# Patient Record
Sex: MALE | Race: WHITE | NOT HISPANIC OR LATINO | URBAN - METROPOLITAN AREA
[De-identification: names, ages, dates, MRNs, and addresses within clinical notes are randomized per-mention and may not be internally consistent; named-entity substitution may affect disease eponyms.]

---

## 2017-03-08 ENCOUNTER — AMBULATORY - HEALTHEAST (OUTPATIENT)
Dept: CARDIOLOGY | Facility: CLINIC | Age: 82
End: 2017-03-08

## 2017-03-08 DIAGNOSIS — I35.0 AORTIC STENOSIS: ICD-10-CM

## 2017-03-08 DIAGNOSIS — I65.29 CAROTID STENOSIS: ICD-10-CM

## 2017-03-23 ENCOUNTER — AMBULATORY - HEALTHEAST (OUTPATIENT)
Dept: CARDIOLOGY | Facility: CLINIC | Age: 82
End: 2017-03-23

## 2017-03-23 DIAGNOSIS — I35.0 SEVERE AORTIC STENOSIS: ICD-10-CM

## 2017-03-23 ASSESSMENT — MIFFLIN-ST. JEOR: SCORE: 1515.38

## 2017-03-24 ASSESSMENT — MIFFLIN-ST. JEOR: SCORE: 1517.19

## 2017-03-25 ASSESSMENT — MIFFLIN-ST. JEOR: SCORE: 1529.89

## 2017-03-26 ASSESSMENT — MIFFLIN-ST. JEOR: SCORE: 1537.61

## 2017-03-27 ENCOUNTER — SURGERY - HEALTHEAST (OUTPATIENT)
Dept: CARDIOLOGY | Facility: CLINIC | Age: 82
End: 2017-03-27

## 2017-03-27 ASSESSMENT — MIFFLIN-ST. JEOR: SCORE: 1536.41

## 2017-03-28 ASSESSMENT — MIFFLIN-ST. JEOR: SCORE: 1535.79

## 2017-03-29 ENCOUNTER — AMBULATORY - HEALTHEAST (OUTPATIENT)
Dept: CARDIOLOGY | Facility: CLINIC | Age: 82
End: 2017-03-29

## 2017-03-29 DIAGNOSIS — I35.0 SEVERE AORTIC STENOSIS: ICD-10-CM

## 2017-03-29 ASSESSMENT — MIFFLIN-ST. JEOR: SCORE: 1531.25

## 2017-04-03 ENCOUNTER — HOSPITAL ENCOUNTER (OUTPATIENT)
Dept: RESPIRATORY THERAPY | Facility: HOSPITAL | Age: 82
Discharge: HOME | End: 2017-04-03
Attending: INTERNAL MEDICINE

## 2017-04-03 DIAGNOSIS — I35.0 SEVERE AORTIC STENOSIS: ICD-10-CM

## 2017-04-04 ENCOUNTER — OFFICE VISIT - HEALTHEAST (OUTPATIENT)
Dept: CARDIOLOGY | Facility: CLINIC | Age: 82
End: 2017-04-04

## 2017-04-04 ENCOUNTER — AMBULATORY - HEALTHEAST (OUTPATIENT)
Dept: CARDIOLOGY | Facility: CLINIC | Age: 82
End: 2017-04-04

## 2017-04-04 DIAGNOSIS — I35.0 SEVERE AORTIC STENOSIS: ICD-10-CM

## 2017-04-04 DIAGNOSIS — I35.0 AORTIC STENOSIS: ICD-10-CM

## 2017-04-04 ASSESSMENT — MIFFLIN-ST. JEOR: SCORE: 1534.43

## 2017-04-05 ENCOUNTER — AMBULATORY - HEALTHEAST (OUTPATIENT)
Dept: CARDIOLOGY | Facility: CLINIC | Age: 82
End: 2017-04-05

## 2017-04-07 ENCOUNTER — AMBULATORY - HEALTHEAST (OUTPATIENT)
Dept: CARDIOLOGY | Facility: CLINIC | Age: 82
End: 2017-04-07

## 2017-04-11 ENCOUNTER — AMBULATORY - HEALTHEAST (OUTPATIENT)
Dept: CARDIOLOGY | Facility: CLINIC | Age: 82
End: 2017-04-11

## 2017-04-11 ENCOUNTER — ANESTHESIA - HEALTHEAST (OUTPATIENT)
Dept: CARDIOLOGY | Facility: CLINIC | Age: 82
End: 2017-04-11

## 2017-04-11 ENCOUNTER — SURGERY - HEALTHEAST (OUTPATIENT)
Dept: CARDIOLOGY | Facility: CLINIC | Age: 82
End: 2017-04-11

## 2017-04-11 ENCOUNTER — HOSPITAL ENCOUNTER (OUTPATIENT)
Dept: RADIOLOGY | Facility: CLINIC | Age: 82
Discharge: HOME | End: 2017-04-11
Attending: INTERNAL MEDICINE

## 2017-04-11 DIAGNOSIS — I50.43 ACUTE ON CHRONIC COMBINED SYSTOLIC AND DIASTOLIC CONGESTIVE HEART FAILURE (H): ICD-10-CM

## 2017-04-11 DIAGNOSIS — I35.0 SEVERE AORTIC STENOSIS: ICD-10-CM

## 2017-04-11 DIAGNOSIS — Z01.810 PRE-OPERATIVE CARDIOVASCULAR EXAMINATION, HIGH RISK SURGERY: ICD-10-CM

## 2017-04-11 LAB
ATRIAL RATE - MUSE: 57 BPM
DIASTOLIC BLOOD PRESSURE - MUSE: NORMAL MMHG
INTERPRETATION ECG - MUSE: NORMAL
P AXIS - MUSE: 80 DEGREES
PR INTERVAL - MUSE: 234 MS
QRS DURATION - MUSE: 86 MS
QT - MUSE: 438 MS
QTC - MUSE: 426 MS
R AXIS - MUSE: 8 DEGREES
SYSTOLIC BLOOD PRESSURE - MUSE: NORMAL MMHG
T AXIS - MUSE: 90 DEGREES
VENTRICULAR RATE- MUSE: 57 BPM

## 2017-04-11 ASSESSMENT — MIFFLIN-ST. JEOR: SCORE: 1516.29

## 2017-04-12 ENCOUNTER — SURGERY - HEALTHEAST (OUTPATIENT)
Dept: CARDIOLOGY | Facility: CLINIC | Age: 82
End: 2017-04-12

## 2017-04-12 ENCOUNTER — AMBULATORY - HEALTHEAST (OUTPATIENT)
Dept: CARDIOLOGY | Facility: CLINIC | Age: 82
End: 2017-04-12

## 2017-04-12 DIAGNOSIS — Z95.2 S/P TAVR (TRANSCATHETER AORTIC VALVE REPLACEMENT): ICD-10-CM

## 2017-04-12 ASSESSMENT — MIFFLIN-ST. JEOR: SCORE: 1514.47

## 2017-04-13 ASSESSMENT — MIFFLIN-ST. JEOR: SCORE: 1553.93

## 2017-04-14 ASSESSMENT — MIFFLIN-ST. JEOR: SCORE: 1557.96

## 2017-04-15 ENCOUNTER — HOME CARE/HOSPICE - HEALTHEAST (OUTPATIENT)
Dept: HOME HEALTH SERVICES | Facility: HOME HEALTH | Age: 82
End: 2017-04-15

## 2017-04-15 ASSESSMENT — MIFFLIN-ST. JEOR: SCORE: 1536.98

## 2017-04-18 ENCOUNTER — AMBULATORY - HEALTHEAST (OUTPATIENT)
Dept: CARDIOLOGY | Facility: CLINIC | Age: 82
End: 2017-04-18

## 2017-04-25 ENCOUNTER — AMBULATORY - HEALTHEAST (OUTPATIENT)
Dept: CARDIAC REHAB | Facility: HOSPITAL | Age: 82
End: 2017-04-25

## 2017-04-25 DIAGNOSIS — I35.0 SEVERE AORTIC STENOSIS: ICD-10-CM

## 2017-04-25 DIAGNOSIS — Z95.2 S/P TAVR (TRANSCATHETER AORTIC VALVE REPLACEMENT): ICD-10-CM

## 2017-05-01 ENCOUNTER — HOME CARE/HOSPICE - HEALTHEAST (OUTPATIENT)
Dept: HOME HEALTH SERVICES | Facility: HOME HEALTH | Age: 82
End: 2017-05-01

## 2017-05-02 ENCOUNTER — HOME CARE/HOSPICE - HEALTHEAST (OUTPATIENT)
Dept: HOME HEALTH SERVICES | Facility: HOME HEALTH | Age: 82
End: 2017-05-02

## 2017-05-04 ENCOUNTER — HOME CARE/HOSPICE - HEALTHEAST (OUTPATIENT)
Dept: HOME HEALTH SERVICES | Facility: HOME HEALTH | Age: 82
End: 2017-05-04

## 2017-05-06 ENCOUNTER — HOME CARE/HOSPICE - HEALTHEAST (OUTPATIENT)
Dept: HOME HEALTH SERVICES | Facility: HOME HEALTH | Age: 82
End: 2017-05-06

## 2017-05-08 ENCOUNTER — HOME CARE/HOSPICE - HEALTHEAST (OUTPATIENT)
Dept: HOME HEALTH SERVICES | Facility: HOME HEALTH | Age: 82
End: 2017-05-08

## 2017-05-09 ENCOUNTER — HOME CARE/HOSPICE - HEALTHEAST (OUTPATIENT)
Dept: HOME HEALTH SERVICES | Facility: HOME HEALTH | Age: 82
End: 2017-05-09

## 2017-05-10 ENCOUNTER — HOME CARE/HOSPICE - HEALTHEAST (OUTPATIENT)
Dept: HOME HEALTH SERVICES | Facility: HOME HEALTH | Age: 82
End: 2017-05-10

## 2017-05-11 ENCOUNTER — HOME CARE/HOSPICE - HEALTHEAST (OUTPATIENT)
Dept: HOME HEALTH SERVICES | Facility: HOME HEALTH | Age: 82
End: 2017-05-11

## 2017-05-12 ENCOUNTER — RECORDS - HEALTHEAST (OUTPATIENT)
Dept: ADMINISTRATIVE | Facility: OTHER | Age: 82
End: 2017-05-12

## 2017-05-13 ENCOUNTER — HOME CARE/HOSPICE - HEALTHEAST (OUTPATIENT)
Dept: HOME HEALTH SERVICES | Facility: HOME HEALTH | Age: 82
End: 2017-05-13

## 2017-05-15 ENCOUNTER — HOSPITAL ENCOUNTER (OUTPATIENT)
Dept: CARDIOLOGY | Facility: HOSPITAL | Age: 82
Discharge: HOME | End: 2017-05-15
Attending: INTERNAL MEDICINE

## 2017-05-15 DIAGNOSIS — Z95.2 S/P TAVR (TRANSCATHETER AORTIC VALVE REPLACEMENT): ICD-10-CM

## 2017-05-15 LAB
AORTIC ROOT: 2.9 CM
AORTIC VALVE MEAN VELOCITY: 194 CM/S
AR DECEL SLOPE: 1820 MM/S2
AR PEAK VELOCITY: 288 CM/S
AV DIMENSIONLESS INDEX VTI: 0.3
AV MEAN GRADIENT: 17 MMHG
AV PEAK GRADIENT: 33.9 MMHG
AV REGURGITANT PEAK GRADIENT: 33.2 MMHG
AV REGURGITATION PRESSURE HALF TIME: 533 MS
AV VALVE AREA: 0.9 CM2
AV VELOCITY RATIO: 0.3
BSA FOR ECHO PROCEDURE: 2.06 M2
CV BLOOD PRESSURE: NORMAL MMHG
CV ECHO HEIGHT: 69 IN
CV ECHO WEIGHT: 192 LBS
DOP CALC AO PEAK VEL: 291 CM/S
DOP CALC AO VTI: 69.8 CM
DOP CALC LVOT AREA: 2.83 CM2
DOP CALC LVOT DIAMETER: 1.9 CM
DOP CALC LVOT PEAK VEL: 85 CM/S
DOP CALC LVOT STROKE VOLUME: 62.9 CM3
DOP CALCLVOT PEAK VEL VTI: 22.2 CM
ECHO EJECTION FRACTION ESTIMATED: 60 %
EJECTION FRACTION: 50 % (ref 55–75)
FRACTIONAL SHORTENING: 38.1 % (ref 28–44)
INTERVENTRICULAR SEPTUM IN END DIASTOLE: 0.83 CM (ref 0.6–1)
IVS/PW RATIO: 0.6
LA AREA 1: 17.2 CM2
LA AREA 2: 13.8 CM2
LEFT ATRIUM LENGTH: 4.2 CM
LEFT ATRIUM SIZE: 3 CM
LEFT ATRIUM TO AORTIC ROOT RATIO: 1.03 NO UNITS
LEFT ATRIUM VOLUME INDEX: 23.3 ML/M2
LEFT ATRIUM VOLUME: 48 CM3
LEFT VENTRICLE CARDIAC INDEX: 1.7 L/MIN/M2
LEFT VENTRICLE CARDIAC OUTPUT: 3.5 L/MIN
LEFT VENTRICLE DIASTOLIC VOLUME INDEX: 51 CM3/M2 (ref 34–74)
LEFT VENTRICLE DIASTOLIC VOLUME: 105 CM3 (ref 62–150)
LEFT VENTRICLE HEART RATE: 56 BPM
LEFT VENTRICLE MASS INDEX: 95.3 G/M2
LEFT VENTRICLE SYSTOLIC VOLUME INDEX: 25.2 CM3/M2 (ref 11–31)
LEFT VENTRICLE SYSTOLIC VOLUME: 52 CM3 (ref 21–61)
LEFT VENTRICULAR INTERNAL DIMENSION IN DIASTOLE: 4.83 CM (ref 4.2–5.8)
LEFT VENTRICULAR INTERNAL DIMENSION IN SYSTOLE: 2.99 CM (ref 2.5–4)
LEFT VENTRICULAR MASS: 196.3 G
LEFT VENTRICULAR OUTFLOW TRACT MEAN GRADIENT: 2 MMHG
LEFT VENTRICULAR OUTFLOW TRACT MEAN VELOCITY: 62 CM/S
LEFT VENTRICULAR OUTFLOW TRACT PEAK GRADIENT: 3 MMHG
LEFT VENTRICULAR POSTERIOR WALL IN END DIASTOLE: 1.37 CM (ref 0.6–1)
LV STROKE VOLUME INDEX: 30.5 ML/M2
MITRAL VALVE E/A RATIO: 1.7
MV AVERAGE E/E' RATIO: 11.6 CM/S
MV DECELERATION TIME: 215 MS
MV E'TISSUE VEL-LAT: 8.61 CM/S
MV E'TISSUE VEL-MED: 6.58 CM/S
MV LATERAL E/E' RATIO: 10.2
MV MEDIAL E/E' RATIO: 13.4
MV PEAK A VELOCITY: 52 CM/S
MV PEAK E VELOCITY: 88.1 CM/S
NUC REST DIASTOLIC VOLUME INDEX: 3072 LBS
NUC REST SYSTOLIC VOLUME INDEX: 69 IN
RIGHT VENTRICULAR INTERNAL DIMENSION IN DYSTOLE: 2.4 CM
TRICUSPID VALVE ANULAR PLANE SYSTOLIC EXCURSION: 2.1 CM

## 2017-05-15 ASSESSMENT — MIFFLIN-ST. JEOR: SCORE: 1516.29

## 2017-05-16 ENCOUNTER — HOME CARE/HOSPICE - HEALTHEAST (OUTPATIENT)
Dept: HOME HEALTH SERVICES | Facility: HOME HEALTH | Age: 82
End: 2017-05-16

## 2017-05-17 ENCOUNTER — HOME CARE/HOSPICE - HEALTHEAST (OUTPATIENT)
Dept: HOME HEALTH SERVICES | Facility: HOME HEALTH | Age: 82
End: 2017-05-17

## 2017-05-18 ENCOUNTER — HOME CARE/HOSPICE - HEALTHEAST (OUTPATIENT)
Dept: HOME HEALTH SERVICES | Facility: HOME HEALTH | Age: 82
End: 2017-05-18

## 2017-05-18 ENCOUNTER — OFFICE VISIT - HEALTHEAST (OUTPATIENT)
Dept: CARDIOLOGY | Facility: CLINIC | Age: 82
End: 2017-05-18

## 2017-05-18 DIAGNOSIS — Z95.2 S/P TAVR (TRANSCATHETER AORTIC VALVE REPLACEMENT): ICD-10-CM

## 2017-05-18 DIAGNOSIS — I63.9 CVA (CEREBRAL VASCULAR ACCIDENT) (H): ICD-10-CM

## 2017-05-18 LAB
ATRIAL RATE - MUSE: 69 BPM
DIASTOLIC BLOOD PRESSURE - MUSE: NORMAL MMHG
INTERPRETATION ECG - MUSE: NORMAL
P AXIS - MUSE: 83 DEGREES
PR INTERVAL - MUSE: 198 MS
QRS DURATION - MUSE: 88 MS
QT - MUSE: 394 MS
QTC - MUSE: 445 MS
R AXIS - MUSE: -25 DEGREES
SYSTOLIC BLOOD PRESSURE - MUSE: NORMAL MMHG
T AXIS - MUSE: 52 DEGREES
VENTRICULAR RATE- MUSE: 77 BPM

## 2017-05-18 ASSESSMENT — MIFFLIN-ST. JEOR: SCORE: 1498.14

## 2017-05-19 ENCOUNTER — HOME CARE/HOSPICE - HEALTHEAST (OUTPATIENT)
Dept: HOME HEALTH SERVICES | Facility: HOME HEALTH | Age: 82
End: 2017-05-19

## 2017-05-22 ENCOUNTER — HOSPITAL ENCOUNTER (OUTPATIENT)
Dept: CARDIOLOGY | Facility: HOSPITAL | Age: 82
Discharge: HOME | End: 2017-05-22
Attending: INTERNAL MEDICINE

## 2017-05-22 DIAGNOSIS — I63.9 CVA (CEREBRAL VASCULAR ACCIDENT) (H): ICD-10-CM

## 2017-05-23 ENCOUNTER — HOME CARE/HOSPICE - HEALTHEAST (OUTPATIENT)
Dept: HOME HEALTH SERVICES | Facility: HOME HEALTH | Age: 82
End: 2017-05-23

## 2017-05-24 ENCOUNTER — HOME CARE/HOSPICE - HEALTHEAST (OUTPATIENT)
Dept: HOME HEALTH SERVICES | Facility: HOME HEALTH | Age: 82
End: 2017-05-24

## 2017-05-25 ENCOUNTER — HOME CARE/HOSPICE - HEALTHEAST (OUTPATIENT)
Dept: HOME HEALTH SERVICES | Facility: HOME HEALTH | Age: 82
End: 2017-05-25

## 2017-05-25 ENCOUNTER — COMMUNICATION - HEALTHEAST (OUTPATIENT)
Dept: CARDIOLOGY | Facility: CLINIC | Age: 82
End: 2017-05-25

## 2017-05-25 DIAGNOSIS — I47.29 NSVT (NONSUSTAINED VENTRICULAR TACHYCARDIA) (H): ICD-10-CM

## 2017-05-26 ENCOUNTER — HOME CARE/HOSPICE - HEALTHEAST (OUTPATIENT)
Dept: HOME HEALTH SERVICES | Facility: HOME HEALTH | Age: 82
End: 2017-05-26

## 2017-05-29 ENCOUNTER — HOME CARE/HOSPICE - HEALTHEAST (OUTPATIENT)
Dept: HOME HEALTH SERVICES | Facility: HOME HEALTH | Age: 82
End: 2017-05-29

## 2017-06-08 ENCOUNTER — RECORDS - HEALTHEAST (OUTPATIENT)
Dept: ADMINISTRATIVE | Facility: OTHER | Age: 82
End: 2017-06-08

## 2017-06-22 ENCOUNTER — RECORDS - HEALTHEAST (OUTPATIENT)
Dept: ADMINISTRATIVE | Facility: OTHER | Age: 82
End: 2017-06-22

## 2017-07-05 ENCOUNTER — RECORDS - HEALTHEAST (OUTPATIENT)
Dept: LAB | Facility: CLINIC | Age: 82
End: 2017-07-05

## 2017-07-05 LAB
CHOLEST SERPL-MCNC: 155 MG/DL
FASTING STATUS PATIENT QL REPORTED: NORMAL
HBA1C MFR BLD: 8.4 % (ref 4.2–6.1)
HDLC SERPL-MCNC: 58 MG/DL
LDLC SERPL CALC-MCNC: 78 MG/DL
TRIGL SERPL-MCNC: 95 MG/DL

## 2017-09-18 ENCOUNTER — AMBULATORY - HEALTHEAST (OUTPATIENT)
Dept: CARDIOLOGY | Facility: CLINIC | Age: 82
End: 2017-09-18

## 2017-09-18 ENCOUNTER — RECORDS - HEALTHEAST (OUTPATIENT)
Dept: ADMINISTRATIVE | Facility: OTHER | Age: 82
End: 2017-09-18

## 2017-09-21 ENCOUNTER — OFFICE VISIT - HEALTHEAST (OUTPATIENT)
Dept: CARDIOLOGY | Facility: CLINIC | Age: 82
End: 2017-09-21

## 2017-09-21 ENCOUNTER — AMBULATORY - HEALTHEAST (OUTPATIENT)
Dept: CARDIOLOGY | Facility: CLINIC | Age: 82
End: 2017-09-21

## 2017-09-21 DIAGNOSIS — I10 ESSENTIAL HYPERTENSION WITH GOAL BLOOD PRESSURE LESS THAN 140/90: ICD-10-CM

## 2017-09-21 DIAGNOSIS — I50.30 HEART FAILURE WITH PRESERVED EJECTION FRACTION, BORDERLINE, CLASS III (H): ICD-10-CM

## 2017-09-21 ASSESSMENT — MIFFLIN-ST. JEOR: SCORE: 1536.69

## 2017-09-22 ENCOUNTER — COMMUNICATION - HEALTHEAST (OUTPATIENT)
Dept: CARDIOLOGY | Facility: CLINIC | Age: 82
End: 2017-09-22

## 2017-09-22 DIAGNOSIS — I16.0 HYPERTENSIVE URGENCY: ICD-10-CM

## 2017-09-28 ENCOUNTER — AMBULATORY - HEALTHEAST (OUTPATIENT)
Dept: CARDIOLOGY | Facility: CLINIC | Age: 82
End: 2017-09-28

## 2017-09-28 DIAGNOSIS — I50.30 HEART FAILURE WITH PRESERVED EJECTION FRACTION, BORDERLINE, CLASS III (H): ICD-10-CM

## 2017-10-21 ENCOUNTER — HOME CARE/HOSPICE - HEALTHEAST (OUTPATIENT)
Dept: HOME HEALTH SERVICES | Facility: HOME HEALTH | Age: 82
End: 2017-10-21

## 2017-10-24 ENCOUNTER — OFFICE VISIT - HEALTHEAST (OUTPATIENT)
Dept: GERIATRICS | Facility: CLINIC | Age: 82
End: 2017-10-24

## 2017-10-24 DIAGNOSIS — Z95.2 S/P TAVR (TRANSCATHETER AORTIC VALVE REPLACEMENT): ICD-10-CM

## 2017-10-24 DIAGNOSIS — R55 PRE-SYNCOPE: ICD-10-CM

## 2017-10-24 DIAGNOSIS — R55 POSTURAL DIZZINESS WITH PRESYNCOPE: ICD-10-CM

## 2017-10-24 DIAGNOSIS — R42 POSTURAL DIZZINESS WITH PRESYNCOPE: ICD-10-CM

## 2017-10-24 DIAGNOSIS — S22.39XA CLOSED RIB FRACTURE: ICD-10-CM

## 2017-10-24 DIAGNOSIS — I35.0 SEVERE AORTIC STENOSIS: ICD-10-CM

## 2017-10-27 ENCOUNTER — OFFICE VISIT - HEALTHEAST (OUTPATIENT)
Dept: GERIATRICS | Facility: CLINIC | Age: 82
End: 2017-10-27

## 2017-10-27 DIAGNOSIS — R55 PRE-SYNCOPE: ICD-10-CM

## 2017-10-27 DIAGNOSIS — S22.39XA CLOSED RIB FRACTURE: ICD-10-CM

## 2017-10-27 DIAGNOSIS — G47.00 INSOMNIA: ICD-10-CM

## 2017-10-27 DIAGNOSIS — Z95.2 S/P TAVR (TRANSCATHETER AORTIC VALVE REPLACEMENT): ICD-10-CM

## 2017-10-31 ENCOUNTER — OFFICE VISIT - HEALTHEAST (OUTPATIENT)
Dept: GERIATRICS | Facility: CLINIC | Age: 82
End: 2017-10-31

## 2017-10-31 DIAGNOSIS — G47.00 INSOMNIA: ICD-10-CM

## 2017-10-31 DIAGNOSIS — I10 UNCONTROLLED HYPERTENSION: ICD-10-CM

## 2017-10-31 DIAGNOSIS — S22.39XA CLOSED RIB FRACTURE: ICD-10-CM

## 2017-11-02 ENCOUNTER — AMBULATORY - HEALTHEAST (OUTPATIENT)
Dept: GERIATRICS | Facility: CLINIC | Age: 82
End: 2017-11-02

## 2017-11-06 ENCOUNTER — OFFICE VISIT - HEALTHEAST (OUTPATIENT)
Dept: CARDIOLOGY | Facility: CLINIC | Age: 82
End: 2017-11-06

## 2017-11-06 DIAGNOSIS — I16.0 HYPERTENSIVE URGENCY: ICD-10-CM

## 2017-11-06 DIAGNOSIS — I50.30 HEART FAILURE WITH PRESERVED EJECTION FRACTION, BORDERLINE, CLASS III (H): ICD-10-CM

## 2017-11-06 DIAGNOSIS — Z95.2 S/P AVR (AORTIC VALVE REPLACEMENT): ICD-10-CM

## 2017-11-06 ASSESSMENT — MIFFLIN-ST. JEOR: SCORE: 1548.04

## 2017-11-13 ENCOUNTER — HOSPITAL ENCOUNTER (OUTPATIENT)
Dept: CARDIOLOGY | Facility: HOSPITAL | Age: 82
Discharge: HOME | End: 2017-11-13
Attending: INTERNAL MEDICINE

## 2017-11-13 LAB
AORTIC ROOT: 3.5 CM
AORTIC VALVE MEAN VELOCITY: 209 CM/S
AR DECEL SLOPE: 3500 MM/S2
AR PEAK VELOCITY: 438 CM/S
AV DIMENSIONLESS INDEX VTI: 0.5
AV MEAN GRADIENT: 20 MMHG
AV PEAK GRADIENT: 40.7 MMHG
AV REGURGITANT PEAK GRADIENT: 76.7 MMHG
AV REGURGITATION PRESSURE HALF TIME: 368 MS
AV VALVE AREA: 1.4 CM2
AV VELOCITY RATIO: 0.4
BSA FOR ECHO PROCEDURE: 2.09 M2
CV BLOOD PRESSURE: NORMAL MMHG
CV ECHO HEIGHT: 69 IN
CV ECHO WEIGHT: 199 LBS
DOP CALC AO PEAK VEL: 319 CM/S
DOP CALC AO VTI: 69.4 CM
DOP CALC LVOT AREA: 2.83 CM2
DOP CALC LVOT DIAMETER: 1.9 CM
DOP CALC LVOT PEAK VEL: 131 CM/S
DOP CALC LVOT STROKE VOLUME: 99.8 CM3
DOP CALCLVOT PEAK VEL VTI: 35.2 CM
EJECTION FRACTION: 60 % (ref 55–75)
FRACTIONAL SHORTENING: 43.9 % (ref 28–44)
INTERVENTRICULAR SEPTUM IN END DIASTOLE: 1.1 CM (ref 0.6–1)
IVS/PW RATIO: 0.9
LA AREA 1: 24.6 CM2
LA AREA 2: 23.1 CM2
LEFT ATRIUM LENGTH: 5.5 CM
LEFT ATRIUM SIZE: 4.7 CM
LEFT ATRIUM TO AORTIC ROOT RATIO: 1.88 NO UNITS
LEFT ATRIUM VOLUME INDEX: 42 ML/M2
LEFT ATRIUM VOLUME: 87.8 CM3
LEFT VENTRICLE CARDIAC INDEX: 3.1 L/MIN/M2
LEFT VENTRICLE CARDIAC OUTPUT: 6.4 L/MIN
LEFT VENTRICLE DIASTOLIC VOLUME INDEX: 46.5 CM3/M2 (ref 34–74)
LEFT VENTRICLE DIASTOLIC VOLUME: 97.1 CM3 (ref 62–150)
LEFT VENTRICLE HEART RATE: 64 BPM
LEFT VENTRICLE MASS INDEX: 92.3 G/M2
LEFT VENTRICLE SYSTOLIC VOLUME INDEX: 18.8 CM3/M2 (ref 11–31)
LEFT VENTRICLE SYSTOLIC VOLUME: 39.2 CM3 (ref 21–61)
LEFT VENTRICULAR INTERNAL DIMENSION IN DIASTOLE: 4.6 CM (ref 4.2–5.8)
LEFT VENTRICULAR INTERNAL DIMENSION IN SYSTOLE: 2.58 CM (ref 2.5–4)
LEFT VENTRICULAR MASS: 192.9 G
LEFT VENTRICULAR OUTFLOW TRACT MEAN GRADIENT: 4 MMHG
LEFT VENTRICULAR OUTFLOW TRACT MEAN VELOCITY: 94.6 CM/S
LEFT VENTRICULAR OUTFLOW TRACT PEAK GRADIENT: 7 MMHG
LEFT VENTRICULAR POSTERIOR WALL IN END DIASTOLE: 1.2 CM (ref 0.6–1)
LV STROKE VOLUME INDEX: 47.7 ML/M2
MITRAL VALVE E/A RATIO: 1.4
MV AVERAGE E/E' RATIO: 14.6 CM/S
MV DECELERATION TIME: 187 MS
MV E'TISSUE VEL-LAT: 7.06 CM/S
MV E'TISSUE VEL-MED: 6 CM/S
MV LATERAL E/E' RATIO: 13.5
MV MEDIAL E/E' RATIO: 15.9
MV PEAK A VELOCITY: 67.9 CM/S
MV PEAK E VELOCITY: 95.6 CM/S
NUC REST DIASTOLIC VOLUME INDEX: 3184 LBS
NUC REST SYSTOLIC VOLUME INDEX: 69 IN
TRICUSPID REGURGITATION PEAK PRESSURE GRADIENT: 13.1 MMHG
TRICUSPID VALVE PEAK REGURGITANT VELOCITY: 181 CM/S

## 2017-11-13 ASSESSMENT — MIFFLIN-ST. JEOR: SCORE: 1548.04

## 2017-11-20 ENCOUNTER — AMBULATORY - HEALTHEAST (OUTPATIENT)
Dept: CARDIOLOGY | Facility: CLINIC | Age: 82
End: 2017-11-20

## 2017-11-20 ENCOUNTER — SURGERY - HEALTHEAST (OUTPATIENT)
Dept: CARDIOLOGY | Facility: CLINIC | Age: 82
End: 2017-11-20

## 2017-11-20 DIAGNOSIS — T82.03XA PERIVALVULAR LEAK OF PROSTHETIC HEART VALVE: ICD-10-CM

## 2017-11-24 ENCOUNTER — SURGERY - HEALTHEAST (OUTPATIENT)
Dept: CARDIOLOGY | Facility: CLINIC | Age: 82
End: 2017-11-24

## 2017-12-04 ENCOUNTER — OFFICE VISIT - HEALTHEAST (OUTPATIENT)
Dept: CARDIOLOGY | Facility: CLINIC | Age: 82
End: 2017-12-04

## 2017-12-04 ENCOUNTER — RECORDS - HEALTHEAST (OUTPATIENT)
Dept: ADMINISTRATIVE | Facility: OTHER | Age: 82
End: 2017-12-04

## 2017-12-04 ENCOUNTER — HOSPITAL ENCOUNTER (OUTPATIENT)
Dept: CARDIOLOGY | Facility: HOSPITAL | Age: 82
Discharge: HOME | End: 2017-12-04
Attending: INTERNAL MEDICINE

## 2017-12-04 DIAGNOSIS — R00.1 BRADYCARDIA: ICD-10-CM

## 2017-12-04 ASSESSMENT — MIFFLIN-ST. JEOR: SCORE: 1559.37

## 2017-12-13 ENCOUNTER — COMMUNICATION - HEALTHEAST (OUTPATIENT)
Dept: CARDIOLOGY | Facility: CLINIC | Age: 82
End: 2017-12-13

## 2018-02-07 ENCOUNTER — RECORDS - HEALTHEAST (OUTPATIENT)
Dept: ADMINISTRATIVE | Facility: OTHER | Age: 83
End: 2018-02-07

## 2018-02-14 ENCOUNTER — RECORDS - HEALTHEAST (OUTPATIENT)
Dept: LAB | Facility: CLINIC | Age: 83
End: 2018-02-14

## 2018-02-14 LAB — URATE SERPL-MCNC: 4.9 MG/DL (ref 3–8)

## 2018-03-08 ENCOUNTER — AMBULATORY - HEALTHEAST (OUTPATIENT)
Dept: CARDIOLOGY | Facility: CLINIC | Age: 83
End: 2018-03-08

## 2018-03-08 DIAGNOSIS — Z95.2 S/P TAVR (TRANSCATHETER AORTIC VALVE REPLACEMENT): ICD-10-CM

## 2018-03-12 ENCOUNTER — RECORDS - HEALTHEAST (OUTPATIENT)
Dept: LAB | Facility: CLINIC | Age: 83
End: 2018-03-12

## 2018-03-12 LAB
CREAT UR-MCNC: 101.9 MG/DL
MICROALBUMIN UR-MCNC: 84.32 MG/DL (ref 0–1.99)
MICROALBUMIN/CREAT UR: 827.5 MG/G

## 2018-04-03 ENCOUNTER — RECORDS - HEALTHEAST (OUTPATIENT)
Dept: ADMINISTRATIVE | Facility: OTHER | Age: 83
End: 2018-04-03

## 2018-04-03 ENCOUNTER — AMBULATORY - HEALTHEAST (OUTPATIENT)
Dept: CARDIOLOGY | Facility: CLINIC | Age: 83
End: 2018-04-03

## 2018-04-05 ENCOUNTER — RECORDS - HEALTHEAST (OUTPATIENT)
Dept: LAB | Facility: CLINIC | Age: 83
End: 2018-04-05

## 2018-04-05 LAB
ALBUMIN SERPL-MCNC: 3.5 G/DL (ref 3.5–5)
ALP SERPL-CCNC: 81 U/L (ref 45–120)
ALT SERPL W P-5'-P-CCNC: 12 U/L (ref 0–45)
ANION GAP SERPL CALCULATED.3IONS-SCNC: 13 MMOL/L (ref 5–18)
AST SERPL W P-5'-P-CCNC: 18 U/L (ref 0–40)
BASOPHILS # BLD AUTO: 0.1 THOU/UL (ref 0–0.2)
BASOPHILS NFR BLD AUTO: 1 % (ref 0–2)
BILIRUB SERPL-MCNC: 2 MG/DL (ref 0–1)
BUN SERPL-MCNC: 12 MG/DL (ref 8–28)
CALCIUM SERPL-MCNC: 8.9 MG/DL (ref 8.5–10.5)
CHLORIDE BLD-SCNC: 104 MMOL/L (ref 98–107)
CHOLEST SERPL-MCNC: 150 MG/DL
CO2 SERPL-SCNC: 23 MMOL/L (ref 22–31)
CREAT SERPL-MCNC: 0.93 MG/DL (ref 0.7–1.3)
EOSINOPHIL # BLD AUTO: 0.2 THOU/UL (ref 0–0.4)
EOSINOPHIL NFR BLD AUTO: 2 % (ref 0–6)
ERYTHROCYTE [DISTWIDTH] IN BLOOD BY AUTOMATED COUNT: 13.8 % (ref 11–14.5)
FASTING STATUS PATIENT QL REPORTED: YES
GFR SERPL CREATININE-BSD FRML MDRD: >60 ML/MIN/1.73M2
GLUCOSE BLD-MCNC: 209 MG/DL (ref 70–125)
HCT VFR BLD AUTO: 41 % (ref 40–54)
HDLC SERPL-MCNC: 40 MG/DL
HGB BLD-MCNC: 13.6 G/DL (ref 14–18)
LDLC SERPL CALC-MCNC: 87 MG/DL
LYMPHOCYTES # BLD AUTO: 1.7 THOU/UL (ref 0.8–4.4)
LYMPHOCYTES NFR BLD AUTO: 21 % (ref 20–40)
MCH RBC QN AUTO: 30.7 PG (ref 27–34)
MCHC RBC AUTO-ENTMCNC: 33.2 G/DL (ref 32–36)
MCV RBC AUTO: 93 FL (ref 80–100)
MONOCYTES # BLD AUTO: 0.9 THOU/UL (ref 0–0.9)
MONOCYTES NFR BLD AUTO: 12 % (ref 2–10)
NEUTROPHILS # BLD AUTO: 4.9 THOU/UL (ref 2–7.7)
NEUTROPHILS NFR BLD AUTO: 64 % (ref 50–70)
PLATELET # BLD AUTO: 197 THOU/UL (ref 140–440)
PMV BLD AUTO: 12.6 FL (ref 8.5–12.5)
POTASSIUM BLD-SCNC: 3.7 MMOL/L (ref 3.5–5)
PROT SERPL-MCNC: 6.8 G/DL (ref 6–8)
RBC # BLD AUTO: 4.43 MILL/UL (ref 4.4–6.2)
SODIUM SERPL-SCNC: 140 MMOL/L (ref 136–145)
TRIGL SERPL-MCNC: 113 MG/DL
TSH SERPL DL<=0.005 MIU/L-ACNC: 3.06 UIU/ML (ref 0.3–5)
URATE SERPL-MCNC: 5 MG/DL (ref 3–8)
WBC: 7.8 THOU/UL (ref 4–11)

## 2018-04-09 ENCOUNTER — OFFICE VISIT - HEALTHEAST (OUTPATIENT)
Dept: CARDIOLOGY | Facility: CLINIC | Age: 83
End: 2018-04-09

## 2018-04-09 DIAGNOSIS — I95.1 ORTHOSTASIS: ICD-10-CM

## 2018-04-09 ASSESSMENT — MIFFLIN-ST. JEOR: SCORE: 1568.44

## 2018-05-30 ENCOUNTER — COMMUNICATION - HEALTHEAST (OUTPATIENT)
Dept: ADMINISTRATIVE | Facility: CLINIC | Age: 83
End: 2018-05-30

## 2018-06-01 ENCOUNTER — HOME CARE/HOSPICE - HEALTHEAST (OUTPATIENT)
Dept: HOME HEALTH SERVICES | Facility: HOME HEALTH | Age: 83
End: 2018-06-01

## 2018-06-05 ENCOUNTER — COMMUNICATION - HEALTHEAST (OUTPATIENT)
Dept: PHARMACY | Facility: CLINIC | Age: 83
End: 2018-06-05

## 2018-06-05 DIAGNOSIS — J18.9 COMMUNITY ACQUIRED PNEUMONIA, UNSPECIFIED LATERALITY: ICD-10-CM

## 2018-06-05 DIAGNOSIS — I50.33 ACUTE ON CHRONIC DIASTOLIC CONGESTIVE HEART FAILURE (H): ICD-10-CM

## 2018-06-05 DIAGNOSIS — K21.9 GASTRO-ESOPHAGEAL REFLUX DISEASE WITHOUT ESOPHAGITIS: ICD-10-CM

## 2018-06-05 DIAGNOSIS — N40.0 BENIGN PROSTATIC HYPERPLASIA, UNSPECIFIED WHETHER LOWER URINARY TRACT SYMPTOMS PRESENT: ICD-10-CM

## 2018-06-05 DIAGNOSIS — Z71.89 ENCOUNTER FOR HERB AND VITAMIN SUPPLEMENT MANAGEMENT: ICD-10-CM

## 2018-06-05 DIAGNOSIS — Z86.79 HISTORY OF CORONARY ARTERY DISEASE: ICD-10-CM

## 2018-06-05 DIAGNOSIS — E11.9 TYPE 2 DIABETES MELLITUS WITHOUT COMPLICATION, WITH LONG-TERM CURRENT USE OF INSULIN (H): ICD-10-CM

## 2018-06-05 DIAGNOSIS — Z79.4 TYPE 2 DIABETES MELLITUS WITHOUT COMPLICATION, WITH LONG-TERM CURRENT USE OF INSULIN (H): ICD-10-CM

## 2018-06-05 DIAGNOSIS — I48.19 PERSISTENT ATRIAL FIBRILLATION (H): ICD-10-CM

## 2018-06-05 DIAGNOSIS — I10 ESSENTIAL HYPERTENSION: ICD-10-CM

## 2018-06-15 ENCOUNTER — AMBULATORY - HEALTHEAST (OUTPATIENT)
Dept: CARDIOLOGY | Facility: CLINIC | Age: 83
End: 2018-06-15

## 2018-06-15 ENCOUNTER — RECORDS - HEALTHEAST (OUTPATIENT)
Dept: ADMINISTRATIVE | Facility: OTHER | Age: 83
End: 2018-06-15

## 2018-06-21 ENCOUNTER — AMBULATORY - HEALTHEAST (OUTPATIENT)
Dept: CARDIOLOGY | Facility: CLINIC | Age: 83
End: 2018-06-21

## 2018-06-21 ENCOUNTER — OFFICE VISIT - HEALTHEAST (OUTPATIENT)
Dept: CARDIOLOGY | Facility: CLINIC | Age: 83
End: 2018-06-21

## 2018-06-21 DIAGNOSIS — I50.33 ACUTE ON CHRONIC DIASTOLIC CONGESTIVE HEART FAILURE (H): ICD-10-CM

## 2018-06-21 DIAGNOSIS — I50.30 HEART FAILURE WITH PRESERVED EJECTION FRACTION, NYHA CLASS I (H): ICD-10-CM

## 2018-06-21 DIAGNOSIS — I10 ESSENTIAL HYPERTENSION: ICD-10-CM

## 2018-06-21 DIAGNOSIS — I48.19 PERSISTENT ATRIAL FIBRILLATION (H): ICD-10-CM

## 2018-06-21 LAB
ANION GAP SERPL CALCULATED.3IONS-SCNC: 9 MMOL/L (ref 5–18)
BUN SERPL-MCNC: 18 MG/DL (ref 8–28)
CALCIUM SERPL-MCNC: 9 MG/DL (ref 8.5–10.5)
CHLORIDE BLD-SCNC: 107 MMOL/L (ref 98–107)
CO2 SERPL-SCNC: 21 MMOL/L (ref 22–31)
CREAT SERPL-MCNC: 1.31 MG/DL (ref 0.7–1.3)
GFR SERPL CREATININE-BSD FRML MDRD: 52 ML/MIN/1.73M2
GLUCOSE BLD-MCNC: 197 MG/DL (ref 70–125)
MAGNESIUM SERPL-MCNC: 1.3 MG/DL (ref 1.8–2.6)
POTASSIUM BLD-SCNC: 4 MMOL/L (ref 3.5–5)
SODIUM SERPL-SCNC: 137 MMOL/L (ref 136–145)

## 2018-06-21 RX ORDER — AMLODIPINE BESYLATE 10 MG/1
10 TABLET ORAL AT BEDTIME
Qty: 90 TABLET | Refills: 3 | Status: SHIPPED | OUTPATIENT
Start: 2018-06-21 | End: 2018-07-21

## 2018-06-21 RX ORDER — METOPROLOL SUCCINATE 25 MG/1
12.5 TABLET, EXTENDED RELEASE ORAL DAILY
Qty: 45 TABLET | Refills: 3 | Status: SHIPPED | OUTPATIENT
Start: 2018-06-21 | End: 2018-07-21

## 2018-06-21 ASSESSMENT — MIFFLIN-ST. JEOR: SCORE: 1595.66

## 2018-06-22 ENCOUNTER — AMBULATORY - HEALTHEAST (OUTPATIENT)
Dept: CARDIOLOGY | Facility: CLINIC | Age: 83
End: 2018-06-22

## 2018-06-22 DIAGNOSIS — E83.42 HYPOMAGNESEMIA: ICD-10-CM

## 2018-07-07 ENCOUNTER — AMBULATORY - HEALTHEAST (OUTPATIENT)
Dept: OTHER | Facility: CLINIC | Age: 83
End: 2018-07-07

## 2018-07-09 ENCOUNTER — RECORDS - HEALTHEAST (OUTPATIENT)
Dept: ADMINISTRATIVE | Facility: OTHER | Age: 83
End: 2018-07-09

## 2018-07-09 ENCOUNTER — AMBULATORY - HEALTHEAST (OUTPATIENT)
Dept: CARDIOLOGY | Facility: CLINIC | Age: 83
End: 2018-07-09

## 2018-07-12 ENCOUNTER — OFFICE VISIT - HEALTHEAST (OUTPATIENT)
Dept: CARDIOLOGY | Facility: CLINIC | Age: 83
End: 2018-07-12

## 2018-07-12 DIAGNOSIS — I50.30 HEART FAILURE WITH PRESERVED EJECTION FRACTION, NYHA CLASS I (H): ICD-10-CM

## 2018-07-12 DIAGNOSIS — I10 ESSENTIAL HYPERTENSION: ICD-10-CM

## 2018-07-12 DIAGNOSIS — I48.21 PERMANENT ATRIAL FIBRILLATION (H): ICD-10-CM

## 2018-07-12 LAB
ANION GAP SERPL CALCULATED.3IONS-SCNC: 11 MMOL/L (ref 5–18)
BUN SERPL-MCNC: 44 MG/DL (ref 8–28)
CALCIUM SERPL-MCNC: 9.1 MG/DL (ref 8.5–10.5)
CHLORIDE BLD-SCNC: 109 MMOL/L (ref 98–107)
CO2 SERPL-SCNC: 21 MMOL/L (ref 22–31)
CREAT SERPL-MCNC: 1.39 MG/DL (ref 0.7–1.3)
GFR SERPL CREATININE-BSD FRML MDRD: 48 ML/MIN/1.73M2
GLUCOSE BLD-MCNC: 40 MG/DL (ref 70–125)
MAGNESIUM SERPL-MCNC: 2 MG/DL (ref 1.8–2.6)
POTASSIUM BLD-SCNC: 4 MMOL/L (ref 3.5–5)
SODIUM SERPL-SCNC: 141 MMOL/L (ref 136–145)

## 2018-07-12 ASSESSMENT — MIFFLIN-ST. JEOR: SCORE: 1597.93

## 2018-07-13 ENCOUNTER — HOME CARE/HOSPICE - HEALTHEAST (OUTPATIENT)
Dept: HOSPICE | Facility: HOSPICE | Age: 83
End: 2018-07-13

## 2018-07-23 ENCOUNTER — HOME CARE/HOSPICE - HEALTHEAST (OUTPATIENT)
Dept: HOSPICE | Facility: HOSPICE | Age: 83
End: 2018-07-23

## 2018-07-26 ENCOUNTER — RECORDS - HEALTHEAST (OUTPATIENT)
Dept: ADMINISTRATIVE | Facility: OTHER | Age: 83
End: 2018-07-26

## 2018-07-26 ENCOUNTER — AMBULATORY - HEALTHEAST (OUTPATIENT)
Dept: CARDIOLOGY | Facility: CLINIC | Age: 83
End: 2018-07-26

## 2018-08-29 ENCOUNTER — OFFICE VISIT - HEALTHEAST (OUTPATIENT)
Dept: CARDIOLOGY | Facility: CLINIC | Age: 83
End: 2018-08-29

## 2018-08-29 DIAGNOSIS — I89.0 LYMPHEDEMA OF BOTH LOWER EXTREMITIES: ICD-10-CM

## 2018-08-29 DIAGNOSIS — I50.33 ACUTE ON CHRONIC DIASTOLIC CONGESTIVE HEART FAILURE (H): ICD-10-CM

## 2018-08-29 RX ORDER — FUROSEMIDE 40 MG
40 TABLET ORAL
Qty: 60 TABLET | Refills: 3 | Status: SHIPPED
Start: 2018-08-29

## 2018-08-29 ASSESSMENT — MIFFLIN-ST. JEOR: SCORE: 1566.18

## 2021-05-30 VITALS — BODY MASS INDEX: 28.44 KG/M2 | HEIGHT: 69 IN | WEIGHT: 192 LBS

## 2021-05-30 VITALS — HEIGHT: 69 IN | BODY MASS INDEX: 29.03 KG/M2 | WEIGHT: 196 LBS

## 2021-05-30 VITALS — WEIGHT: 192 LBS | HEIGHT: 69 IN | BODY MASS INDEX: 28.44 KG/M2

## 2021-05-30 VITALS — BODY MASS INDEX: 28.93 KG/M2 | WEIGHT: 195.3 LBS | HEIGHT: 69 IN

## 2021-05-30 VITALS — HEIGHT: 69 IN | WEIGHT: 196.56 LBS | BODY MASS INDEX: 29.11 KG/M2

## 2021-05-30 VITALS — WEIGHT: 192 LBS | BODY MASS INDEX: 28.35 KG/M2

## 2021-05-30 VITALS — BODY MASS INDEX: 28.35 KG/M2 | WEIGHT: 192 LBS

## 2021-05-31 VITALS — BODY MASS INDEX: 27.85 KG/M2 | HEIGHT: 69 IN | WEIGHT: 188 LBS

## 2021-05-31 VITALS — BODY MASS INDEX: 28.35 KG/M2 | WEIGHT: 198 LBS | HEIGHT: 70 IN

## 2021-05-31 VITALS — WEIGHT: 199 LBS | HEIGHT: 69 IN | BODY MASS INDEX: 29.47 KG/M2

## 2021-05-31 VITALS — WEIGHT: 200 LBS | HEIGHT: 68 IN | BODY MASS INDEX: 30.31 KG/M2

## 2021-05-31 VITALS — WEIGHT: 199 LBS | BODY MASS INDEX: 29.47 KG/M2 | HEIGHT: 69 IN

## 2021-06-01 VITALS — HEIGHT: 70 IN | WEIGHT: 200 LBS | BODY MASS INDEX: 28.63 KG/M2

## 2021-06-01 VITALS — WEIGHT: 210 LBS | HEIGHT: 69 IN | BODY MASS INDEX: 31.1 KG/M2

## 2021-06-01 VITALS — WEIGHT: 206 LBS | BODY MASS INDEX: 29.49 KG/M2 | HEIGHT: 70 IN

## 2021-06-01 VITALS — BODY MASS INDEX: 30.07 KG/M2 | WEIGHT: 203 LBS | HEIGHT: 69 IN

## 2021-06-09 NOTE — PROGRESS NOTES
RESPIRATORY CARE NOTE     Patient Name: Isreal Lubin  Today's Date: 4/3/2017     Complete PFT done. Pt performed tests with good effort, 2.5 mg Albuterol neb given. Test results does not meet ATS criteria for Pre FVC but did meet ATS criteria for DLCO & Post FVC.  Results reported are patients best effort. Results scanned into epic. Pt left in no distress.       Anna Jimenez

## 2021-06-09 NOTE — PROGRESS NOTES
Patient scheduled for TAVR 4/12. Will come in to see me for pre-TAVR on 4/11 at 9 am. Patient not on blood thinners.

## 2021-06-09 NOTE — CONSULTS
DATE OF SERVICE: 04/04/2017    DATE OF CONSULTATION:  04/04/2017.      I am asked to provide a second opinion regarding transcatheter versus standard  surgical aortic valve replacement on patient Isreal Lubin.     HISTORY OF PRESENT ILLNESS:  Mr. Lubin is an 87-year-old gentleman who was  hospitalized in March because of dizziness and presyncope related to his  nonrheumatic aortic valve stenosis.  He was admitted on the 23rd and discharged on  the 29th.  His other medical problems include hypertension, hyperlipidemia, postural  dizziness with presyncope and type 2 diabetes without complication.  He has been  followed since September of last year for his aortic stenosis.  At that time, he was  not symptomatic.  More recently, he has gone through the workup to see if he would  be a suitable candidate for a transcatheter aortic valve replacement.    PAST SURGICAL AND MEDICAL HISTORY:  SURGICAL PROCEDURES:    1.  Status post rotator cuff repair.  2.  Status post back surgery for a degenerative disk disease.    MEDICAL PROBLEMS:  1.  Aortic stenosis.  2.  Arthritis.  3.  Diabetes mellitus.  4.  Dyslipidemia.  5.  Glaucoma.  6.  Hypertension.    ALLERGIES:  None known.    CURRENT MEDICATIONS:  See chart.    FAMILY HISTORY:  Positive for diabetes.    SOCIAL HISTORY:  He is retired.  He is active.  He does not smoke or ingest alcohol.    REVIEW OF SYSTEMS:  CONSTITUTIONAL:  Weight has been stable.  Has been fairly active.  HEENT:  Remarkable for glaucoma.  RESPIRATORY:  No shortness of breath.  CARDIOVASCULAR:  See HPI.  GASTROINTESTINAL:  Noncontributory.  GENITOURINARY:  Noncontributory.  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  MUSCULOSKELETAL:  Positive for arthritis.    PHYSICAL EXAMINATION:   APPEARANCE:  Elderly gentleman who appears to be fairly spry.  Height is  approximately 5 feet 10 inches, weight is around 90 kilograms.  VITAL SIGNS:  Temperature afebrile, respiratory rate 16, heart rate  68, blood  pressure 106/62.  SKIN:  Scattered senile changes.  LYMPH NODES:  No palpable lymphadenopathy.  HEENT:  Normocephalic.  PERRL.  EOMs intact.  ENT unremarkable.  NECK:  Supple, with bilateral carotid bruits.  CHEST:  Without deformity.  LUNGS:  Clear to auscultation.  HEART:  Regular rate and rhythm with a grade 3/6 systolic ejection murmur heard best  over the right upper sternal border.  Pulses 3+ and symmetrical.  ABDOMEN:  Soft, nontender, without palpable organomegaly, normoactive bowel sounds.  GENITOURINARY AND RECTAL:  Deferred.  NEUROLOGIC:  Grossly intact.  BACK:  Without deformity.  EXTREMITIES:  Full range of motion without clubbing, cyanosis or edema.    LABORATORY:  Remarkable for hemoglobin of 12.9 and a creatinine of 0.91.    ASSESSMENT:  This gentleman's aortic stenosis has clearly reached a point where he  is symptomatic.  He would definitely benefit from aortic valve replacement.  Given  his age, I agree that transcatheter aortic valve replacement would be a better idea  for him, although his STS mortality rate is in the intermediate range and his  morbidity rate is around 10%.  He has had a coronary angiogram that does not show  substantial coronary artery disease and preparations are underway for him to undergo  a transcatheter aortic valve replacement which I feel is his best option given his  age.      LANCE DIEHL MD  ca  D 04/06/2017 10:07:18  T 04/06/2017 10:51:03  R 04/06/2017 10:51:03  [id number]        cc: FRANCIA DIEHL MD

## 2021-06-10 NOTE — ANESTHESIA CARE TRANSFER NOTE
When patient moved from the table to the bed BP dropped to the 70s and 80s. Resistant to martha Dr. Leonard notified. TTE at bedside. Ahmed at bedside. Bicarb given. Fluid bolus given. Blood pressure improved post Bicarb.    Tx to 5144 on monitor report to RN.      Last vitals:   Vitals:    04/12/17 1031   BP: 122/60   Pulse: 60   Resp: 18   Temp: 36.1  C (97  F)   SpO2: 94%     Patient's level of consciousness is drowsy  Spontaneous respirations: yes  Maintains airway independently: yes  Dentition unchanged: yes  Oropharynx: oropharynx clear of all foreign objects    QCDR Measures:  ASA# 20 - Surgical Safety Checklist: ASA20A - Safety Checks Done  PQRS# 430 - Adult PONV Prevention: 4558F - Pt received => 2 anti-emetic agents (different classes) preop & intraop  ASA# 8 - Peds PONV Prevention: NA - Not pediatric patient, not GA or 2 or more risk factors NOT present  PQRS# 424 - Sindy-op Temp Management: 4559F - At least one body temp DOCUMENTED => 35.5C or 95.9F within required timeframe  PQRS# 426 - PACU Transfer Protocol: - Transfer of care checklist used  ASA# 14 - Acute Post-op Pain: ASA14B - Patient did NOT experience pain >= 7 out of 10    I completed my SBAR handoff to the receiving nurse per policy and procedure.

## 2021-06-10 NOTE — ANESTHESIA PROCEDURE NOTES
Arterial Line  Reason for Procedure: hemodynamic monitoring and multiple ABGs  Patient location during procedure: OR pre-induction  Start time: 4/12/2017 6:48 AM  End time: 4/12/2017 6:58 AM  Staffing:  Performing  Anesthesiologist: MALACHI HURTADO  Sterile Precautions:  sterile barriers used during insertion: cap, mask, sterile gloves, large sheet, and hand hygiene used.  Arterial Line:   Immediately prior to procedure a time out was called to verify the correct patient, procedure, equipment, support staff and site/side marked as required  Laterality: left  Location: radial  Prepped with: ChloroPrep    Needle gauge: 20 G  Number of Attempts: 1  Secured with: tape and transparent dressing  Flushed with: saline  1% lidocaine local anesthesia used for skin prep.   See MAR for additional medications given.

## 2021-06-10 NOTE — PROGRESS NOTES
ITP ASSESSMENT   Assessment Day: Initial  Session Number: 1  Precautions: Falls  Diagnosis: Valve  Risk Stratification: Medium  Referring Provider: Dania Aldridge MD  EXERCISE  Exercise Assessment: Initial   Exercised on Nustep for 15 minutes at 2.1 METS.                       Exercise Plan  Goals Next 30 days  ADL'S: Drive Car, Walk without walker  Leisure: return to mall walking, [place].  Work: Retired    Education Goals: Medication review  Education Goals Met: Has system for taking medication.;Patient can state cardiac s/s and appropriate emergency response.    Exercise Prescription  Exercise Mode: Treadmill;Nustep;Bike;Arm Erg.;Hallway Walking  Frequency: 2 days/week  Duration: 30-40 minutes  Intensity / THR: 20-30 beats above resting heart rate  RPE 11-14  Progression / Met level: 2.5  Resistive Training?: No    Current Exercise (mins/week): 30    Interventions  Home Exercise:  Mode: Walking  Frequency: 4-5 days/week  Duration: 20-30 minutes    Education Material : Educational videos;Provide written material;Individual education and counseling;Offer educational classes    Education Completed  Exercise Education Completed: Cardiac Anatomy;Signs and Symptoms;Medication review;RPE;Emergency Plan;Home Exercise;Warm up/cool down;FITT Principles;BP/HR Reponse to exercise;Benefits of Exercise            Exercise Follow-up/Discharge  Follow up/Discharge: Pt wants to return to mall walking NUTRITION  Nutrition Assessment: Initial    Nutrition Risk Factors:  Nutrition Risk Factors: Diabetes;Dyslipidemia;Overweight  HbA1c: 7.5  Monitors blood sugar at home: Yes  Frequency: 3x/day  Cholesterol: 143  LDL: 70  HDL: 52  Triglycerides: 107    Nutrition Plan  Interventions  Nutrition Interventions: Diet consult;Diet class;Therapist/Patient discussion;Educational videos;Provide with written material      Education Completed  Nutrition Education Completed: Risk factor overview    Goals  Nutrition Goals  "(Next 30 days): Patient can identify their risk factors for CAD;Patient will follow a low sodium diet;Patient will follow a low saturated fat diet;Patient knows appropriate portion size    Goals Met  Nutrition Goals Met: Patient can identify their risk factors for CAD;Provided Rate your Plate Survey;Reviewed Dietitian schedule    Height, Weight, and  BMI  Weight: 192 lb (87.1 kg)  Height: 5' 9\" (1.753 m)  BMI: 28.34    Nutrition Follow-up  Follow-up/Discharge: Encouraged low sodium, low fat diet       Other Risk Factors  Other Risk Factor Assessment: Initial    HTN Risk Factor: Hypertension    Pre Exercise BP: 156/63  Post Exercise BP: 126/62    Hypertension Plan  Goals  HTN Goals: Follow low sodium diet;Take medication as prescribed;Exercises regularly    Goals Met  HTN Goals Met: Exercises regularly;Take medication as prescribed    HTN Interventions  HTN Interventions: Diet consult;Therapist/patient discussion;Provide written material;Offer educational videos;Offer educational classes    HTN Education Completed  HTN Education Completed: Risk factor overview    Tobacco Risk Factor: NA      Risk Factor Follow-up   Follow-up/Discharge: Pt plans to continue home exercise and low sodium diet.   PSYCHOSOCIAL  Psychosocial Assessment: Initial     [place] COOP Q of L Summary Score: 15    TINO-D Score: 6    Psychosocial Risk Factor: NA    Psychosocial Plan  Interventions  If TINO-D > 15 send letter to MD  Interventions: Offer Social Service consult;Offer Spiritual Care consult;Offer educational videos and classes;Provide written material;Individual education and counseling    Education Completed  Education Completed: Effects of stress on body    Goals  Goals (Next 30 days): Identified Support system;Improvement in [place] COOP score;Practicing stress management skills    Goals Met  Goals Met: Identified Support system;Identify stressors;Oriented to stress management classes    Psychosocial Follow-up  Follow-up/Discharge: " Stated very little stress at home           Patient involved in Goal setting?: Yes    Signature: _____________________________________________________________    Date: __________________    Time: __________________

## 2021-06-10 NOTE — PROGRESS NOTES
Cardiac Rehab  Phase II Assessment    Assessment Date: 4-25-17    Diagnosis: Severe Aortic Stenosis  Procedure: TAVR  Date of Onset: 4/12/17  ICD/Pacemaker: No   Post-op Complications: None  ECG History: Sinus Bradycardia, 1st degree AVB EF%:65-75%  Past Medical History:   Patient Active Problem List   Diagnosis     Diabetes Mellitus     Hyperlipidemia     Hypertension     Aortic Stenosis     Nausea     Diaphoresis     Cerebrovascular disease     Postural dizziness with presyncope     Type 2 diabetes mellitus without complication, with long-term current use of insulin     Severe aortic stenosis     Nonrheumatic aortic valve stenosis     S/P TAVR (transcatheter aortic valve replacement)     Adjustment disorder with anxiety     Chronic systolic congestive heart failure     Acute diastolic heart failure     Hypervolemia, unspecified hypervolemia type     Dyslipidemia         Physical Assessment  Precautions/ Physical Limitations: Falls-currently walking with walker since surgery  Oxygen: No  O2 Sats: 91-97% on RA Lung Sounds: Clear Edema: None  Sleeping Pattern: good   Appetite: good   Nutrition Risk Screen: Will follow up with dietician    Pain  Location: NA  Characteristics:NA  Intensity: (0-10 scale) 0  Current Pain Management: NA  Intervention: NA  Response: NA    Psychosocial/ Emotional Health  1. In the past 12 months, have you been in a relationship where you have been abused physically, emotionally, sexually or financially? No  notified: No  2. Who do you turn to for emotional support?: Daughter-Yudelka  3. Do you have cultural or spiritual needs? No  4. Have there been any major life changes in the past 12 months? No    Referral Information  Primary Physician: Minnie Akhtar MD  Cardiologist: Dr. Mccray      Home exercise/Equipment: Walk 6 days/week for 2 miles (45 minutes)    Patient's long-term goal(s): Drive car, Fishing    1. Living Accommodations: [facility] Steps: Yes      Support people at  home: Daughter   2. Marital Status:   3. Family is able to assist with cares      Sabianism/Community involvement: Goes to [employer] but not much involvement  4. Recreation/Hobbies: Casino, Fishing

## 2021-06-10 NOTE — PROGRESS NOTES
Patient in to see RN for Pre-TAVR visit on 4/11/2017     All pre-procedure labs drawn: 4/11/2017    EKG obtained: 4/11/2017    Patient sent to radiology for chest X ray: 4/11/2017   MRSA nose swab collected: 4/11/2017   Bactroban administered in nares: 4/11/2017   5 meter walk: 4/11/2017 6.93, 7.31, 7.05  IS instructions given to patient by RN. Patient able to demonstrate proper use of IS.     STS score: 4.2%  NYHA score: 3  CCS score: 0      Patient instructed on medications:   Patient instructed to hold all medications the morning of procedure INCLUDING ASA. Will get loading dose upon arrival.       Loading dose of Plavix: 75 mg AM of procedure   Loading dose of ASA: 325 mg AM of procedure    LABS: labs are all reviewed. No further action needed.    Sent home with MAULIK wipes to be done the night before procedure and the morning of procedure, along with instructions to wash sheets and wear clean pajamas after the administration of wipes.    Patient has advanced directive yes, scanned into chart    Education was given to patient regarding what to expect pre and post procedure.     Instructed to come to the main entrance of the [facility] at 0500    Surgery and anesthesia consent was signed at the time of the appt: yes, scanned into chart and sent down to NED     All questions were answered to family and patient by RN.    Patient present at the time of appointment.

## 2021-06-10 NOTE — PROGRESS NOTES
[facility] Note    Assessment / Plan:    Mr Lubin appears to be doing well from the cardiac standpoint, being a month removed from transcatheter aortic valve replacement for severe symptomatic aortic stenosis.    His recent follow-up echocardiogram showed normal function of his aortic bioprosthesis with a mean gradient of 17 mmHg and trace to mild aortic insufficiency.    His EKG in clinic today showed sinus rhythm with a few short runs of atrial tachycardia.  Given his recent CVA, he will be provided a Holter monitor to rule out any subclinical episodes of atrial fibrillation.  Otherwise he has been asked to continue his current medical regimen and knows to call if there is any change in condition or worsening symptoms.    Unless warranted from the neurologic standpoint, he will be able to stop his clopidogrel in 5 months but knows to continue his aspirin indefinitely.      Thank you for the opportunity to participate in the care of Isreal Lubin. Please do not hesitate to call with any questions or concerns regarding his cardiovascular status.    ______________________________________________________________________    Subjective:    It was a pleasure to see Isreal Lubin at the [facility] for 30 day follow-up after transcatheter aortic valve replacement.    Isreal Lubin is a pleasant 87 y.o. male with severe symptomatic aortic stenosis for which he underwent transcatheter aortic valve replacement on April 12, 2017, receiving a 23 mm Konstantin  3 valve via the right transfemoral approach.  His postprocedural course was uncomplicated and he was discharged on postoperative day 3.    He was rehospitalized with right-sided facial weakness and left arm weakness 2 weeks later and was diagnosed with an acute CVA.  No further intervention was warranted for his stroke and he was asked to continue his antiplatelet regimen and statins.    He made a full recovery in regards to his CVA with no  residual weakness or limitation.  He has also been steadily improving his activity level and notices significant improvement in his functional capacity since his TAVR.  ______________________________________________________________________    Problem List:  Patient Active Problem List   Diagnosis     Diabetes Mellitus     Hyperlipidemia     Hypertension     Cerebrovascular disease     Postural dizziness with presyncope     Type 2 diabetes mellitus without complication, with long-term current use of insulin     Severe aortic stenosis     Nonrheumatic aortic valve stenosis     S/P TAVR (transcatheter aortic valve replacement)     Adjustment disorder with anxiety     Chronic systolic congestive heart failure     Acute diastolic heart failure     Hypervolemia, unspecified hypervolemia type     Dyslipidemia     Bilateral White matter infarction     Pneumonia of right upper lobe due to infectious organism       Medical History:  Past Medical History:   Diagnosis Date     Aortic stenosis      Arthritis      Chronic systolic congestive heart failure      Diabetes mellitus      Dyslipidemia      Glaucoma      Heart murmur      Hyperlipidemia     Created by Conversion      Hypertension        Surgical History:  Past Surgical History:   Procedure Laterality Date     BACK SURGERY      Disc     CARDIAC CATHETERIZATION N/A 3/27/2017    Procedure: Coronary Angiogram;  Surgeon: Desmond Kaplan MD;  Location: [facility];  Service:      ROTATOR CUFF REPAIR Left        Social History:  Social History     Social History     Marital status: Single     Spouse name: N/A     Number of children: N/A     Years of education: N/A     Occupational History     Not on file.     Social History Main Topics     Smoking status: Former Smoker     Quit date: 9/29/1964     Smokeless tobacco: Not on file     Alcohol use 1.2 oz/week     2 Shots of liquor per week      Comment: daily     Drug use: No     Sexual activity: Not on file     Other  "Topics Concern     Not on file     Social History Narrative       Review of Systems: 12 organ system review done and negative except as noted in the HPI.    Family History:  Family History   Problem Relation Age of Onset     Diabetes Mother          Allergies:  No Known Allergies    Medications:  Current Outpatient Prescriptions   Medication Sig Dispense Refill     aspirin 81 MG EC tablet Take 81 mg by mouth every evening.        clopidogrel (PLAVIX) 75 mg tablet Take 1 tablet (75 mg total) by mouth daily. 60 tablet 0     cyanocobalamin (VITAMIN B-12) 1000 MCG tablet Take 1,000 mcg by mouth every evening.        dorzolamide-timolol (COSOPT) 2-0.5 % ophthalmic solution Administer 1 drop into the left eye 2 (two) times a day.       glipiZIDE (GLUCOTROL) 10 MG tablet 2 tablets in the morning and 1 tablet in the evening       insulin glargine (LANTUS) 100 unit/mL injection Inject 18 Units under the skin bedtime.        lansoprazole (PREVACID) 15 MG capsule Take 15 mg by mouth daily.        latanoprost (XALATAN) 0.005 % ophthalmic solution Administer 1 drop into the left eye bedtime.       lisinopril (ZESTRIL) 10 MG tablet Take 10 mg by mouth daily. Indications: hypertension       metFORMIN (GLUCOPHAGE) 1000 MG tablet Take 1 tablet (1,000 mg total) by mouth 2 (two) times a day with meals. 60 tablet 0     simvastatin (ZOCOR) 20 MG tablet Take 20 mg by mouth bedtime.       therapeutic multivitamin (THERAGRAN) tablet Take 1 tablet by mouth every evening.        No current facility-administered medications for this visit.        Objective:   Vital signs:  /64 (Patient Site: Left Arm, Patient Position: Sitting, Cuff Size: Adult Regular)  Pulse 80  Resp 16  Ht 5' 9\" (1.753 m)  Wt 188 lb (85.3 kg)  BMI 27.76 kg/m2      Physical Exam:    GENERAL APPEARANCE: Alert, cooperative and in no acute distress.  HEENT: No scleral icterus. No Xanthelasma. Oral mucuos membranes pink and moist.  NECK: No JVD. Thyroid not " visualized  CHEST: clear to auscultation  CARDIOVASCULAR: S1, S2 irregular. No significant murmur noted.   PULSES: Radial and posterior tibial pulses are intact and symmetric.   ABDOMEN: Nontender. BS+.   EXTREMITIES: No cyanosis, clubbing or edema.  SKIN: Warm, well perfused  NEURO: Grossly nonfocal    Lab Results:  LIPIDS:  Lab Results   Component Value Date    CHOL 143 03/07/2016    CHOL 157 02/23/2015     Lab Results   Component Value Date    HDL 52 03/07/2016    HDL 54 02/23/2015     Lab Results   Component Value Date    LDLCALC 70 03/07/2016    LDLCALC 83 02/23/2015     Lab Results   Component Value Date    TRIG 107 03/07/2016    TRIG 99 02/23/2015     No components found for: CHOLHDL    BMP:  Lab Results   Component Value Date    CREATININE 0.98 05/18/2017    BUN 14 05/18/2017     (L) 05/18/2017    K 3.8 05/18/2017    CL 99 05/18/2017    CO2 25 05/18/2017         ECG and Cath films independently reviewed      MOISÉS EDGAR MD  [facility]

## 2021-06-10 NOTE — ANESTHESIA POSTPROCEDURE EVALUATION
Patient: Isreal Lubin  Transfemoral Transcatheter Aortic Valve Replacement, OR standby - GENERAL ANESTHESIA WHOLE CASE, TAVR, DR EDGAR, DR JACOBSEN, DR ALEMAN, H[facility]P D[facility] TRANSFEMORAL TRANSCATHETER AORTIC VALVE REPLACEMENT (STANDBY)  Anesthesia type: MAC    Patient location: PACU  Last vitals:   Vitals:    04/12/17 1530   BP:    Pulse: 64   Resp: 12   Temp: 36.3  C (97.4  F)   SpO2: 94%     Post vital signs: stable  Level of consciousness: awake and responds to simple questions  Post-anesthesia pain: pain controlled  Post-anesthesia nausea and vomiting: no  Pulmonary: unassisted, return to baseline  Cardiovascular: stable and blood pressure at baseline  Hydration: adequate  Anesthetic events: no    QCDR Measures:  ASA# 11 - Sindy-op Cardiac Arrest: ASA11B - Patient did NOT experience unanticipated cardiac arrest  ASA# 12 - Sindy-op Mortality Rate: ASA12B - Patient did NOT die  ASA# 13 - PACU Re-Intubation Rate: NA - No ETT / LMA used for case  ASA# 10 - Composite Anes Safety: ASA10A - No serious adverse event  ASA# 38 - New Corneal Injury: ASA38A - No new exposure keratitis or corneal abrasion in PACU    Additional Notes:

## 2021-06-10 NOTE — ANESTHESIA PROCEDURE NOTES
Central line    Start time: 4/12/2017 7:00 AM  End time: 4/12/2017 7:15 AM  Patient location: OR Pre-induction  Indications: central pressure monitoring and vascular access  Performing Anesthesiologist: MALACHI HURTADO  Pre-procedure Checklist  Completed: patient identified, site marked, risks, benefits, and alternatives discussed, timeout performed, consent obtained, hand hygiene performed, all elements of maximal sterile barriers used including cap, mask, gown, sterile gloves, and large sheet and skin prep agent completely dried prior to procedure    Procedure Details:  Lidocaine 1% local anesthesia used for skin prep  Preparation: 2% chlorhexidine  Location details: right internal jugular  Catheter type: TLCC  Introducer type: MAC  Lumens:double lumenNumber of attempts: 1  Ultrasound evaluation of access site: yes  Vessel patent by US exam  Concurrent real time visualization of needle entry  manometry confirmation of venous access    Post-procedure:   line sutured  Assessment: blood return through all ports  Complications: none

## 2021-06-10 NOTE — PROGRESS NOTES
Cardiac Rehab Discharge Note:  Pt started outpatient cardiac rehab on 4/25/2017 s/p TAVR on 4/12/2017. He attended only his initial evaluation and never returned. Therefore, will be discharged from the program.

## 2021-06-13 NOTE — PROGRESS NOTES
Patient seen in clinic for HF education s/p recent hospital discharge 9/7/17.    Reviewed HF Binder that includes the  HF Sx Awareness & Action plan  handout and  A Stronger Pump  booklet and Weight log booklet highlighting :  __X_patient s type of heart failure _X__Na management in diet  __X_importance of daily wts  _X__Fluid Guidelines, if applicable  __X_medication review and importance of compliance     Instructed patient in signs and sx of heart failure, reiterated when to call clinic - reviewed HF hotline # [phone] and after hours call # [phone].  Majority of time was spent reviewing: low sodium diet and symptom management. Patient lives with daughter who does the majority of the cooking and shopping. She is not at the appointment today but was encouraged for patient to have her read educational material provided. Patient is also unsure of some of his medications and will call tomorrow to review his list.   Patient verbalized understanding of HF discussion.  Plan for f/u in 3-4 weeks with HF clinic with continued HF education reviewed.

## 2021-06-13 NOTE — PROGRESS NOTES
[facility]    Facility:   [facility] [[id number]]   Code Status: FULL CODE  PCP: Luis Antonio Shook MD   Phone: [phone]   Fax: [phone]      CHIEF COMPLAINT/REASON FOR VISIT:  Chief Complaint   Patient presents with     Discharge Summary       HISTORY COURSE:  Isreal is a 88 y.o. male who was admitted to [facility] on October 19 and transferred to this facility October 21, 2017.  Isreal was admitted, brought by daughter after a fall striking his head and his torso against the handle of a couch.  He recalls the incident vividly in great details today.  He thinks that he might have lost consciousness for a little bit.  He is not quite sure but he says that he also might have felt a little dizzy.  In fact this sensation is not unusual for him.  He has had more severe dizziness upon upright position in the past.  In April in fact he underwent T AVR for severe aortic stenosis which was felt to have contributed much to his postural dizziness and presyncope.  After his T AVR, he felt that his symptoms got better but did not go away completely.     There was also some concerns about a stroke by his daughter however CT scan did not reveal the same.  Further evaluation revealed that he had sustained a nondisplaced fracture on the posterior right 10th and 11th rib with resultant small pneumothorax.     His respirations and oxygenation was not compromised.  He was stabilized in the hospital, and then sent here for further rehabilitation.     Review of most recent echocardiogram revealed normal-appearing and functioning bioprosthetic aortic valve.  Holter monitor was placed while in the hospital with no mention of any significant abnormality.        Today the patient was walking with physical therapy with modified independence and supervision only.  He does use the walker but at baseline, he rarely uses any assistive device.  He was able to walk 200 feet without  the device in fact.  He was able to walk a full flight of steps with supervision only.  Physical therapy tested him for fall risk and he is a low fall risk.     In the transitional care unit, he did pretty well with physical therapy and Occupational Therapy.  He did not have any dizziness he did not feel presyncopal.  Denies any palpitations.  At the time of discharge, he was transferring in independently ambulating independently 700 feet with no assistive device and PT with no concerns for going back to previous level of functioning and previous residence. Denies any shortness of breath denies any chest pressures.  Appetite has been good no trouble with bowel or bladder habits.    Still with significant pain on the right abdomen secondary to muscle injury as well as rib fracture.  Blood sugars however have been running very high.  We have been adjusting his insulin regimen now and will increase it further to 26 units of Lantus from 24 units and continuing metformin 1 g twice daily and glipizide 20/10    Blood pressures have been high however heart rates have been in the lower 50s.  Lisinopril had to be increased to 40 mg.  Otherwise no other symptoms noted.    Review of Systems  Unremarkable except for those noted otherwise  There were no vitals filed for this visit.    Physical Exam  Vital signs noted heart rate 50-55 blood pressures in the 160s blood sugars in the high 100s-200s.  Patient is alert, awake, oriented to time, place and person, not in acute cardiorespiratory distress  Skin: Warm, and moist,  no lesions,   Head: atraumatic, normocephalic,   Eyes: EOM's intact and conjugate, pink palpebral conjunctivae, anicteric sclerae, pupils reactive  Lungs : Clear to auscultation , no crackles, wheezes or rhonchi  Heart : Nornal first and second heart sounds, No murmurs, heaves, or thrills  Abdomen: Soft, non tender, non distended, no hepatosplenomegaly, no ascites, with tender areas in the ecchymotic region of  the abdominal wall and chest wall  Extremities: No edema, pulses are full and equal      MEDICATION LIST:  Current Outpatient Prescriptions   Medication Sig     melatonin 5 mg cap Take by mouth.     melatonin 5 mg Tab tablet Take 5 mg by mouth at bedtime as needed.     acetaminophen (TYLENOL) 325 MG tablet Take 2 tablets (650 mg total) by mouth 3 (three) times a day.     aspirin 81 MG EC tablet Take 81 mg by mouth daily.      camphor-methyl salicyl-menthol (SALONPAS) PtMd Apply 1 patch topically daily as needed. Remove patch from the skin after at most 8-hour application. Applied to left hand.     clopidogrel (PLAVIX) 75 mg tablet Take 1 tablet (75 mg total) by mouth daily.     cyanocobalamin (VITAMIN B-12) 1000 MCG tablet Take 1,000 mcg by mouth every evening.      dorzolamide-timolol (COSOPT) 2-0.5 % ophthalmic solution Administer 1 drop into the left eye 2 (two) times a day.     furosemide (LASIX) 20 MG tablet Take 1 tablet (20 mg total) by mouth 2 (two) times a day at 9am and 6pm.     glipiZIDE (GLUCOTROL) 10 MG tablet Take 10-20 mg by mouth see administration instructions. 20 mg (2 tablets) in the morning before breakfast and 10 mg (1 tablet) in the evening before supper.     insulin glargine (LANTUS) 100 unit/mL injection Inject 26 Units under the skin at bedtime.      lansoprazole (PREVACID) 15 MG capsule Take 15 mg by mouth daily.      latanoprost (XALATAN) 0.005 % ophthalmic solution Administer 1 drop into the left eye bedtime.     lisinopril (PRINIVIL,ZESTRIL) 20 MG tablet Take 1 tablet (20 mg total) by mouth daily. (Patient taking differently: Take 40 mg by mouth daily. )     metFORMIN (GLUCOPHAGE) 1000 MG tablet Take 1,000 mg by mouth 2 (two) times a day with meals.     metoprolol succinate (TOPROL-XL) 25 MG Take 1 tablet (25 mg total) by mouth daily.     oxyCODONE (ROXICODONE) 5 MG immediate release tablet Take 1 tablet (5 mg total) by mouth every 3 (three) hours as needed.     potassium chloride SA  (K-DUR,KLOR-CON) 10 MEQ tablet Take 1 tablet (10 mEq total) by mouth daily.     simvastatin (ZOCOR) 20 MG tablet Take 20 mg by mouth bedtime.     tamsulosin (FLOMAX) 0.4 mg Cp24 Take 0.4 mg by mouth daily after supper.       DISCHARGE DIAGNOSIS:    ICD-10-CM    1. Closed rib fracture S22.39XA    2. Uncontrolled diabetes mellitus E11.65    3. Uncontrolled hypertension I10    4. Insomnia G47.00        MEDICAL EQUIPMENT NEEDS:  None    DISCHARGE PLAN/FACE TO FACE:  I certify that services are/were furnished while this patient was under the care of a physician and that a physician or an allowed non-physician practitioner (NPP), had a face-to-face encounter that meets the physician face-to-face encounter requirements. The encounter was in whole, or in part, related to the primary reason for home health. The patient is confined to his/her home and needs intermittent skilled nursing, physical therapy, speech-language pathology, or the continued need for occupational therapy. A plan of care has been established by a physician and is periodically reviewed by a physician.  Date of Face-to-Face Encounter: October 31, 2017    I certify that, based on my findings, the following services are medically necessary home health services: He will continue PT OT and nursing services for cardiopulmonary assessment    My clinical findings support the need for the above skilled services because: (Please write a brief narrative summary that describes what the RN, PT, SLP, or other services will be doing in the home. A list of diagnoses in this section does not meet the CMS requirements.)  Still with significant discomfort from rib fracture.  Still uncontrolled blood pressure and blood sugar    This patient is homebound because: (Please write a brief narrative summary describing the functional limitations as to why this patient is homebound and specifically what makes this patient homebound.)  Same    The patient is, or has been, under my  care and I have initiated the establishment of the plan of care. This patient will be followed by a physician who will periodically review the plan of care.  Daughter will be scheduling a close follow-up with the patient's primary care physician.  Total discharge time was more than 35 minutes.  This note has been dictated using voice recognition software. Any grammatical, typographical, or context distortions are unintentional.        Electronically signed by: Fernando Santos MD

## 2021-06-13 NOTE — PROGRESS NOTES
[facility]    Facility:   [facility] [[id number]]   Code Status: FULL CODE      CHIEF COMPLAINT/REASON FOR VISIT:  Chief Complaint   Patient presents with     Review Of Multiple Medical Conditions       HISTORY:      HPI: Isreal is a 88 y.o. male seen today in the presence of her daughter.  He is doing really well.  He has been walking without any assistive device.  Cognitively intact.  Denies any dizziness upon standing or walking.  Blood sugars are still high.  Likely secondary to the kind of diet that he is in in this facility.   mentions about him not being able to go to sleep.  Sometimes due to pain but sometimes just plain insomnia    Past Medical History:   Diagnosis Date     Aortic stenosis      Arthritis      Chronic systolic congestive heart failure      Diabetes mellitus      Dyslipidemia      Glaucoma      Heart murmur      Hyperlipidemia     Created by Conversion      Hypertension              Family History   Problem Relation Age of Onset     Diabetes Mother      Social History     Social History     Marital status: Single     Spouse name: N/A     Number of children: N/A     Years of education: N/A     Social History Main Topics     Smoking status: Former Smoker     Quit date: 9/29/1964     Smokeless tobacco: Not on file     Alcohol use 1.2 oz/week     2 Shots of liquor per week      Comment: daily     Drug use: No     Sexual activity: Not on file     Other Topics Concern     Not on file     Social History Narrative         Review of Systems  Unremarkable  .There were no vitals filed for this visit.    Physical Exam  Vital signs noted blood sugars in the 200s  Patient is alert, awake, oriented to time, place and person, not in acute cardiorespiratory distress  Skin: Warm, and moist,  no lesions,   Head: atraumatic, normocephalic,   Eyes: EOM's intact and conjugate, pink palpebral conjunctivae, anicteric sclerae, pupils reactive  Lungs : Clear to  auscultation , no crackles, wheezes or rhonchi  Heart : Nornal first and second heart sounds, No murmurs, heaves, or thrills  Abdomen: Soft, still with some tenderness in the right lower quadrant.  R, non distended, no hepatosplenomegaly, no ascites bowel sounds heard no blood in the stool  Extremities: No edema, pulses are full and equal      LABS:   No results found for this or any previous visit (from the past 72 hour(s)).      ASSESSMENT:      ICD-10-CM    1. Closed rib fracture S22.39XA    2. S/P TAVR (transcatheter aortic valve replacement) Z95.2    3. Pre-syncope R55    4. Insomnia G47.00    5. Uncontrolled diabetes mellitus E11.65        PLAN:    Encouraged to ask for stronger pain medication if needed.  This might be causing some of the lack of sleep.  Otherwise he is willing to try a small dose of melatonin.  Increase Lantus further from 22-24 units.  Continue with glipizide 20 in the morning and 10 in the evening  Blood pressures are much better since lisinopril increased from 20-40 continue with metoprolol succinate Lasix    Total 25 minutes of which 55% was spent counseling and coordination of care of the above plan.    Electronically signed by: Fernando Santos MD

## 2021-06-13 NOTE — PROGRESS NOTES
Assessment/Plan:     1. Heart failure with preserved ejection fraction NYHA class III: Isreal Lubin appears not compensated.  Patient continues to have some fatigue and shortness of breath on exertion.  He has crackles in bilateral lower lobes.  Patient recently hospitalized with acute diastolic heart failure exacerbation.  He overall noticed improvement in his sSOB since discharge from the hospital.  Patient lives with his daughter and she cooks low-salt diet for him.We discussed heart failure, following a low salt diet, monitoring weights, and heart failure treatment. He met with a heart failure nurse clinician to discuss heart failure management.      I increased his furosemide from 20 mg once a day to 20 mg twice a day.  I recommended him to get his BMP check in 1 week-order placed.  Patient was recommended follow low-salt diet <2g/day, nature weight daily, and stay physically active as tolerated.      Heart failure treatment includes:    Last BMP was done on 9/14/2017 and was stable.  - Diuretic therapy with furosemide EMG twice daily    2. Hypertension: 180/52, and recheck was 160/60 on left arm and 155/50 on the right arm.  His lisinopril was recently increased from 10 mg to 20 mg daily in the hospital.  Patient is unsure if he is taking the dose correctly.  He agreed to call back and divided dosage.    3. Obstructive sleep apnea: He scored 0 for [place] Sleepiness Scale and 4 for stop bang questionnaire.  Low risk    Aloe up with Dr. Mccray in 6-8 weeks or sooner if possible.  Follow-up in heart failure clinic in 3-4 weeks    Subjective:     Isreal Lubin is seen at [facility] heart failure clinic today for post-hospitalization follow-up.  He was hospitalized at [facility] from 9/5/2017- 9/7/2017 with SOB secondary to diastolic heart failure exacerbation.  Patient was treated with diuretics with good improvement.  He had an echocardiogram showed on 9/6/17 with an EF of 65% with moderate  concentric hypertrophy, mildly increased of left atrial volume, and mild paravalvular insufficiency present with mean gradient 20 mmHg.is past medical history is also significant for hypertension, dyslipidemia and diabetes, severe aortic stenosis with s/p TAVR and  CVA post TAVR.    Since being discharged from the hospital, patient feels that he is improving.  Ever he continues to have some fatigue and shortness of breath on exertion.   He denies lightheadedness, shortness of breath, orthopnea, PND, palpitations, chest pain, abdominal fullness/bloating and lower extremity edema.        He is monitoring home weights which are stable between 195-200 pounds.  He is following a low sodium diet.  He participates in regular physical activity including walks 1 mile every day.      Medication reconciliation was done today.    Review of Systems:   General: WNL  Eyes: WNL  Ears/Nose/Throat: WNL  Lungs: WNL  Heart: WNL except some fatigue and SCHWARTZ  Stomach: WNL  Bladder: WNL  Muscle/Joints: WNL  Skin: WNL  Nervous System: WNL  Mental Health: WNL     Blood: WNL    Patient Active Problem List   Diagnosis     Type 2 diabetes mellitus with complication, unspecified long term insulin use status     Hyperlipidemia     Hypertension     Cerebrovascular disease     Postural dizziness with presyncope     Type 2 diabetes mellitus without complication, with long-term current use of insulin     Severe aortic stenosis     Nonrheumatic aortic valve stenosis     S/P TAVR (transcatheter aortic valve replacement)     Adjustment disorder with anxiety     Hypervolemia, unspecified hypervolemia type     Dyslipidemia     Bilateral White matter infarction     Pneumonia of right upper lobe due to infectious organism     Acute CHF (congestive heart failure)     Hypomagnesemia     Hypokalemia     Past Medical History:   Diagnosis Date     Aortic stenosis      Arthritis      Chronic systolic congestive heart failure      Diabetes mellitus       Dyslipidemia      Glaucoma      Heart murmur      Hyperlipidemia     Created by Conversion      Hypertension        Past Surgical History:   Procedure Laterality Date     BACK SURGERY      Disc     CARDIAC CATHETERIZATION N/A 3/27/2017    Procedure: Coronary Angiogram;  Surgeon: Desmond Kaplan MD;  Location: [facility];  Service:      ROTATOR CUFF REPAIR Left        Family History   Problem Relation Age of Onset     Diabetes Mother      Social History     Social History     Marital status: Single     Spouse name: N/A     Number of children: N/A     Years of education: N/A     Occupational History     Not on file.     Social History Main Topics     Smoking status: Former Smoker     Quit date: 9/29/1964     Smokeless tobacco: Not on file     Alcohol use 1.2 oz/week     2 Shots of liquor per week      Comment: daily     Drug use: No     Sexual activity: Not on file     Other Topics Concern     Not on file     Social History Narrative     Current Outpatient Prescriptions   Medication Sig Dispense Refill     aspirin 81 MG EC tablet Take 81 mg by mouth every evening.        clopidogrel (PLAVIX) 75 mg tablet Take 1 tablet (75 mg total) by mouth daily. 60 tablet 0     cyanocobalamin (VITAMIN B-12) 1000 MCG tablet Take 1,000 mcg by mouth every evening.        dorzolamide-timolol (COSOPT) 2-0.5 % ophthalmic solution Administer 1 drop into the left eye 2 (two) times a day.       furosemide (LASIX) 20 MG tablet Take 1 tablet (20 mg total) by mouth 2 (two) times a day at 9am and 6pm. 60 tablet 0     glipiZIDE (GLUCOTROL) 10 MG tablet Take 10-20 mg by mouth see administration instructions. 2 tablets in the morning and 1 tablet in the evening       insulin glargine (LANTUS) 100 unit/mL injection Inject 18 Units under the skin bedtime.        lansoprazole (PREVACID) 15 MG capsule Take 15 mg by mouth daily.        latanoprost (XALATAN) 0.005 % ophthalmic solution Administer 1 drop into the left eye bedtime.        lisinopril (PRINIVIL,ZESTRIL) 20 MG tablet Take 1 tablet (20 mg total) by mouth daily. 30 tablet 0     metFORMIN (GLUCOPHAGE) 1000 MG tablet Take 1,000 mg by mouth 2 (two) times a day with meals.       metoprolol succinate (TOPROL-XL) 25 MG Take 1 tablet (25 mg total) by mouth daily.       potassium chloride SA (K-DUR,KLOR-CON) 10 MEQ tablet Take 1 tablet (10 mEq total) by mouth daily. 30 tablet 0     simvastatin (ZOCOR) 20 MG tablet Take 20 mg by mouth bedtime.       tamsulosin (FLOMAX) 0.4 mg Cp24 Take 0.4 mg by mouth daily after supper.       No current facility-administered medications for this visit.      No Known Allergies    Objective:     Vitals:    09/21/17 1515   BP: 155/50   Pulse: (!) 56   Resp:    SpO2:      Wt Readings from Last 3 Encounters:   09/21/17 200 lb (90.7 kg)   09/07/17 196 lb 9.6 oz (89.2 kg)   05/18/17 188 lb (85.3 kg)       General Appearance:   Alert, cooperative and in no acute distress.   HEENT:  No scleral icterus; the mucous membranes were pink and moist.   Neck: JVP normal. No HJR   Chest: The spine was straight. The chest was symmetric.   Lungs:   Respirations unlabored; crackles heard in bilateral lower lobes.   Cardiovascular:   Regular rhythm. S1 and S2 without murmur, clicks or rubs. Radial and posterior tibial pulses are intact and symmetrical.    Abdomen:  Soft, nontender, nondistended, bowel sounds present   Extremities: No cyanosis or trace edema.   Skin: No xanthelasma.   Neurologic: Mood and affect are appropriate.         Lab Review   Lab Results   Component Value Date    CREATININE 1.09 09/14/2017    BUN 16 09/14/2017     09/14/2017    K 3.9 09/14/2017     09/14/2017    CO2 23 09/14/2017     Lab Results   Component Value Date     (H) 09/05/2017     BNP (pg/mL)   Date Value   09/05/2017 345 (H)   04/14/2017 548 (H)   04/11/2017 115 (H)     Creatinine (mg/dL)   Date Value   09/14/2017 1.09   09/07/2017 0.97   09/05/2017 0.87   05/18/2017 0.98        Cardiographics  Echocardiogram on 9/6/2017  Summary     Left ventricle ejection fraction is normal. The calculated left ventricular ejection fraction is 66%.    Bioprosthetic aortic valve with mean gradient of 20 mmHg. Mild perivalvular insufficiency noted.    When compared to the previous study dated 5/15/2017, the mean gradient has increased slightly.         Echocardiogram on 5/15/2017   Summary     When compared to the previous study dated 4/13/2017, paravalvular aortic insufficiency appears to be increased. Transvalvular gradients are also increased.    Left ventricle ejection fraction is normal. The estimated left ventricular ejection fraction is 60%.       50  minutes were face to face spent with the patient with greater than 50% spent on education and counseling.      Karmen Morgan, [facility]   Heart Failure Clinic

## 2021-06-13 NOTE — PROGRESS NOTES
[facility]      Facility:    [facility] [[id number]]  Code Status: FULL CODE      Chief Complaint/Reason for Visit:  No chief complaint on file.      HPI:   Isreal is a 88 y.o. male who was admitted to [facility] on October 19 and transferred to this facility October 21, 2017.  Isreal was admitted, brought by daughter after a fall striking his head and his torso against the handle of a couch.  He recalls the incident vividly in great details today.  He thinks that he might have lost consciousness for a little bit.  He is not quite sure but he says that he also might have felt a little dizzy.  In fact this sensation is not unusual for him.  He has had more severe dizziness upon upright position in the past.  In April in fact he underwent T AVR for severe aortic stenosis which was felt to have contributed much to his postural dizziness and presyncope.  After his T AVR, he felt that his symptoms got better but did not go away completely.    There was also some concerns about a stroke by his daughter however CT scan did not reveal the same.  Further evaluation revealed that he had sustained a nondisplaced fracture on the posterior right 10th and 11th rib with resultant small pneumothorax.    His respirations and oxygenation was not compromised.  He was stabilized in the hospital, and then sent here for further rehabilitation.    Review of most recent echocardiogram revealed normal-appearing and functioning bioprosthetic aortic valve.  Holter monitor was placed while in the hospital with no mention of any significant abnormality.      Today the patient was walking with physical therapy with modified independence and supervision only.  He does use the walker but at baseline, he rarely uses any assistive device.  He was able to walk 200 feet without the device in fact.  He was able to walk a full flight of steps with supervision only.  Physical therapy tested him for  fall risk and he is a low fall risk.    I asked him to stand up today and he did not have any dizziness he did not feel presyncopal.  Denies any palpitations.  Denies any shortness of breath denies any chest pressures.  Appetite has been good no trouble with bowel or bladder habits.  Past Medical History:  Past Medical History:   Diagnosis Date     Aortic stenosis      Arthritis      Chronic systolic congestive heart failure      Diabetes mellitus      Dyslipidemia      Glaucoma      Heart murmur      Hyperlipidemia     Created by Conversion      Hypertension            Surgical History:  Past Surgical History:   Procedure Laterality Date     BACK SURGERY      Disc     CARDIAC CATHETERIZATION N/A 3/27/2017    Procedure: Coronary Angiogram;  Surgeon: Desmond Kaplan MD;  Location: [facility] Cath Lab;  Service:      ROTATOR CUFF REPAIR Left        Family History:   Family History   Problem Relation Age of Onset     Diabetes Mother        Social History:    Social History     Social History     Marital status: Single     Spouse name: N/A     Number of children: N/A     Years of education: N/A     Social History Main Topics     Smoking status: Former Smoker     Quit date: 9/29/1964     Smokeless tobacco: Not on file     Alcohol use 1.2 oz/week     2 Shots of liquor per week      Comment: daily     Drug use: No     Sexual activity: Not on file     Other Topics Concern     Not on file     Social History Narrative          Review of Systems  Pain noted on the right lower quadrant as well as on the right chest wall consistent with rib fracture pain.  Unremarkable except for those noted above.  There were no vitals filed for this visit.    Physical Exam  Vital signs noted.  Ecchymosis noted on the right side of the abdominal wall.  Patient is alert, awake, oriented to time, place and person, not in acute cardiorespiratory distress  Skin: Warm, and moist,  no lesions,   Head: atraumatic, normocephalic,   Eyes: EOM's  intact and conjugate, pink palpebral conjunctivae, anicteric sclerae, pupils reactive  Lungs : Clear to auscultation , no crackles, wheezes or rhonchi  Heart : Nornal first and second heart sounds, No murmurs, heaves, or thrills  Abdomen: Soft, non tender, non distended, no hepatosplenomegaly, no ascites  Extremities: No edema, pulses are full and equal      Medication List:  Current Outpatient Prescriptions   Medication Sig     acetaminophen (TYLENOL) 325 MG tablet Take 2 tablets (650 mg total) by mouth 3 (three) times a day.     aspirin 81 MG EC tablet Take 81 mg by mouth daily.      camphor-methyl salicyl-menthol (SALONPAS) PtMd Apply 1 patch topically daily as needed. Remove patch from the skin after at most 8-hour application. Applied to left hand.     clopidogrel (PLAVIX) 75 mg tablet Take 1 tablet (75 mg total) by mouth daily.     cyanocobalamin (VITAMIN B-12) 1000 MCG tablet Take 1,000 mcg by mouth every evening.      dorzolamide-timolol (COSOPT) 2-0.5 % ophthalmic solution Administer 1 drop into the left eye 2 (two) times a day.     furosemide (LASIX) 20 MG tablet Take 1 tablet (20 mg total) by mouth 2 (two) times a day at 9am and 6pm.     glipiZIDE (GLUCOTROL) 10 MG tablet Take 10-20 mg by mouth see administration instructions. 20 mg (2 tablets) in the morning before breakfast and 10 mg (1 tablet) in the evening before supper.     insulin glargine (LANTUS) 100 unit/mL injection Inject 18 Units under the skin bedtime.      lansoprazole (PREVACID) 15 MG capsule Take 15 mg by mouth daily.      latanoprost (XALATAN) 0.005 % ophthalmic solution Administer 1 drop into the left eye bedtime.     lisinopril (PRINIVIL,ZESTRIL) 20 MG tablet Take 1 tablet (20 mg total) by mouth daily.     metFORMIN (GLUCOPHAGE) 1000 MG tablet Take 1,000 mg by mouth 2 (two) times a day with meals.     metoprolol succinate (TOPROL-XL) 25 MG Take 1 tablet (25 mg total) by mouth daily.     oxyCODONE (ROXICODONE) 5 MG immediate release  tablet Take 1 tablet (5 mg total) by mouth every 3 (three) hours as needed.     potassium chloride SA (K-DUR,KLOR-CON) 10 MEQ tablet Take 1 tablet (10 mEq total) by mouth daily.     simvastatin (ZOCOR) 20 MG tablet Take 20 mg by mouth bedtime.     tamsulosin (FLOMAX) 0.4 mg Cp24 Take 0.4 mg by mouth daily after supper.       Labs:  No results found for this or any previous visit (from the past 72 hour(s)).      Assessment:    ICD-10-CM    1. Pre-syncope R55    2. Postural dizziness with presyncope R42     R55    3. S/P TAVR (transcatheter aortic valve replacement) Z95.2    4. Severe aortic stenosis I35.0    5. Closed rib fracture S22.39XA        Plan:  Continue to monitor for any recurrence of presyncope.  Check orthostatics blood pressure as well as heart rate.  Blood pressure has been running high up to as high as 195/64.  However heart rate is relatively low between 54 and 72.  We will increase lisinopril from 20 mg to 40 mg.  Continue metoprolol 25 mg succinate daily.  Continue Lasix 20 mg twice daily.  Continue clopidogrel for status post T AVR.  He has a follow-up with cardiology on October 26.  Blood sugar not controlled.  Increase Lantus to 22 units at bedtime and continue metformin 1 g twice daily and glipizide 20 mg in the morning and 10 mg at night.  Continue with other home medications.    Total time spent was 60 minutes with more than 50% spent on counseling, discussion of treatment plan and extensive review of available records  This note has been dictated using voice recognition software. Any grammatical, typographical, or context distortions are unintentional.              Electronically signed by: Fernando Santos MD

## 2021-06-14 NOTE — PROGRESS NOTES
[facility]    Assessment / Plan:    Mr Lubin underwent transcatheter aortic valve replacement approximately 9 months ago, receiving a 23 mm S3 valve.  There was mild aortic insufficiency noted post implant, secondary to calcification around the annulus.    This was reevaluated recently with a transesophageal echocardiogram, due to diastolic murmur, and is likely no worse, with 2 mild jets of PVL noted.    For his episodes of lightheadedness and recent fall, repeat Holter monitor was ordered to rule out significant bradycardia.  His metoprolol was also decreased back down to 25 mg daily.    He will likely need additional occasions for his hypertension, and amlodipine may be a good choice, and could be started after his Holter monitor.    Thank you for the opportunity to participate in the care of Isreal Lubin. Please do not hesitate to call with any questions or concerns regarding his cardiovascular status.    ______________________________________________________________________    Subjective:     It was a pleasure to see Isreal Lubin at the [facility] for follow-up after transcatheter aortic valve replacement.     Isreal Lubin is a pleasant 88 y.o. male with severe symptomatic aortic stenosis for which he underwent transcatheter aortic valve replacement on April 12, 2017, receiving a 23 mm Konstantin  3 valve via the right transfemoral approach.  His postprocedural course was uncomplicated and he was discharged on postoperative day 3.     He was rehospitalized with right-sided facial weakness and left arm weakness 2 weeks later and was diagnosed with an acute CVA.  No further intervention was warranted for his stroke and he was asked to continue his antiplatelet regimen and statins.     He made a full recovery in regards to his CVA with no significant residual weakness or limitation.  A Holter monitor was obtained at that time, and was negative for underlying atrial  fibrillation.    Approximately 6 weeks ago, he was hospitalized with a fall.  He states that he has had episodes of lightheadedness more frequently over the past several weeks.  The time that he fell, he states that he passed out for a few seconds.  His heart rate in the clinic today is in the 50s, and review of his vitals from several months ago show heart rates more consistently in the 70s.      Also, he was noted to have a diastolic murmur and a recent visit with Dr. Mccray, and a subsequent echocardiogram suggested paravalvular insufficiency.  This was evaluated with a transesophageal echocardiogram that showed 2 mild jets of PVL, in aggregate likely causing mild to moderate aortic insufficiency.  He has not had any increase in shortness of breath and denies any chest discomfort.  ______________________________________________________________________    Problem List:  Patient Active Problem List   Diagnosis     Type 2 diabetes mellitus with complication, unspecified long term insulin use status     Hyperlipidemia     Hypertension     Cerebrovascular disease     Postural dizziness with presyncope     Type 2 diabetes mellitus without complication, with long-term current use of insulin     Severe aortic stenosis     Nonrheumatic aortic valve stenosis     S/P TAVR (transcatheter aortic valve replacement)     Adjustment disorder with anxiety     Hypervolemia, unspecified hypervolemia type     Dyslipidemia     Bilateral White matter infarction     Pneumonia of right upper lobe due to infectious organism     Acute CHF (congestive heart failure)     Hypomagnesemia     Hypokalemia     TIA (transient ischemic attack)     Fall, initial encounter     Pre-syncope     Right sided abdominal pain     Closed fracture of multiple ribs of right side, initial encounter       Medical History:  Past Medical History:   Diagnosis Date     Aortic stenosis      Arthritis      Chronic systolic congestive heart failure      Diabetes  mellitus      Dyslipidemia      Glaucoma      Heart murmur      Hyperlipidemia     Created by Conversion      Hypertension        Surgical History:  Past Surgical History:   Procedure Laterality Date     BACK SURGERY      Disc     CARDIAC CATHETERIZATION N/A 3/27/2017    Procedure: Coronary Angiogram;  Surgeon: Desmond Kaplan MD;  Location: [facility] Cath Lab;  Service:      ROTATOR CUFF REPAIR Left        Social History:  Social History     Social History     Marital status: Single     Spouse name: N/A     Number of children: N/A     Years of education: N/A     Occupational History     Not on file.     Social History Main Topics     Smoking status: Former Smoker     Quit date: 9/29/1964     Smokeless tobacco: Not on file     Alcohol use 1.2 oz/week     2 Shots of liquor per week      Comment: daily     Drug use: No     Sexual activity: Not on file     Other Topics Concern     Not on file     Social History Narrative       Review of Systems: 12 organ system review done and negative except as noted in the HPI.    Family History:  Family History   Problem Relation Age of Onset     Diabetes Mother          Allergies:  No Known Allergies    Medications:  Current Outpatient Prescriptions   Medication Sig Dispense Refill     aspirin 81 MG EC tablet Take 81 mg by mouth daily.        camphor-methyl salicyl-menthol (SALONPAS) PtMd Apply 1 patch topically daily as needed. Remove patch from the skin after at most 8-hour application. Applied to left hand.       clopidogrel (PLAVIX) 75 mg tablet Take 1 tablet (75 mg total) by mouth daily. 60 tablet 0     cyanocobalamin (VITAMIN B-12) 1000 MCG tablet Take 1,000 mcg by mouth every evening.        dorzolamide-timolol (COSOPT) 2-0.5 % ophthalmic solution Administer 1 drop into the left eye 2 (two) times a day.       glipiZIDE (GLUCOTROL) 10 MG tablet Take 10-20 mg by mouth see administration instructions. 20 mg (2 tablets) in the morning before breakfast and 10 mg (1 tablet) in  "the evening before supper.       insulin glargine (LANTUS) 100 unit/mL injection Inject 18 Units under the skin at bedtime.        lansoprazole (PREVACID) 15 MG capsule Take 15 mg by mouth daily.        latanoprost (XALATAN) 0.005 % ophthalmic solution Administer 1 drop into the left eye bedtime.       lisinopril (PRINIVIL,ZESTRIL) 20 MG tablet Take 20 mg by mouth daily.       melatonin 5 mg Tab tablet Take 5 mg by mouth at bedtime as needed.       metFORMIN (GLUCOPHAGE) 1000 MG tablet Take 1,000 mg by mouth 2 (two) times a day with meals.       metoprolol succinate (TOPROL-XL) 25 MG Take 50 mg by mouth daily.        oxyCODONE (ROXICODONE) 5 MG immediate release tablet Take 1 tablet (5 mg total) by mouth every 3 (three) hours as needed. 45 tablet 0     simvastatin (ZOCOR) 20 MG tablet Take 20 mg by mouth bedtime.       tamsulosin (FLOMAX) 0.4 mg Cp24 Take 0.4 mg by mouth daily after supper.       No current facility-administered medications for this visit.        Objective:   Vital signs:  BP (!) 188/68 (Patient Site: Left Arm, Patient Position: Sitting, Cuff Size: Adult Regular)  Pulse (!) 56  Resp 16  Ht 5' 10\" (1.778 m)  Wt 198 lb (89.8 kg)  BMI 28.41 kg/m2      Physical Exam:    GENERAL APPEARANCE: Alert, cooperative and in no acute distress.  HEENT: No scleral icterus. No Xanthelasma. Oral mucuos membranes pink and moist.  NECK: No JVD. Thyroid not visualized  CHEST: clear to auscultation  CARDIOVASCULAR: S1, S2 regular. Soft ARMIDA with 1-2/6  PULSES: Radial and posterior tibial pulses are intact and symmetric.   ABDOMEN: Nontender. BS+. No bruits.  EXTREMITIES: No cyanosis, clubbing or edema.  SKIN: Warm, well perfused  NEURO: Grossly nonfocal    Lab Results:  LIPIDS:  Lab Results   Component Value Date    CHOL 157 10/19/2017    CHOL 155 07/05/2017    CHOL 143 03/07/2016     Lab Results   Component Value Date    HDL 57 10/19/2017    HDL 58 07/05/2017    HDL 52 03/07/2016     Lab Results   Component Value " Date    LDLCALC 67 10/19/2017    LDLCALC 78 07/05/2017    LDLCALC 70 03/07/2016     Lab Results   Component Value Date    TRIG 165 (H) 10/19/2017    TRIG 95 07/05/2017    TRIG 107 03/07/2016     No components found for: CHOLHDL    BMP:  Lab Results   Component Value Date    CREATININE 0.87 11/30/2017    BUN 13 11/30/2017     11/30/2017    K 3.8 11/30/2017     (H) 11/30/2017    CO2 21 (L) 11/30/2017         ECG and Cath films independently reviewed      MOISÉS EDGAR MD  [facility]

## 2021-06-18 NOTE — PROGRESS NOTES
Notes Recorded by Corrine Massey CNP on 6/22/2018 at 8:10 AM  Chqiuis increased diuretic yesterday. Creatinine slightly elevated.  Mg low at 1.3 mg.  Please start slow mag 64 mg twice daily.  Recheck Mg next week.        Slow may 64 mg twice daily order placed- OTC medication. Repeat Magnesium lab order placed as well. [place] [place]

## 2021-06-18 NOTE — PROGRESS NOTES
Patient seen in clinic for continued HF education.  Patient viewed 'What is heart Failure' video which covers risk factors, symptoms, medications, Na and fluid guidelines, balancing activity and rest, and daily monitoring of symptoms.    Addressed patients questions/concerns- will continue to reinforce HF education with future patient interactions.  Pt and daughter, Yudelka, watched the video. They did not have any questions following video. WIll have lab work done post- video. See DCB in 3-4 weeks and CLL 6-8 weeks. -[facility] [place]

## 2021-06-19 NOTE — PROGRESS NOTES
9:17 AM- Received page for oxygen setup from Brenna. Reviewed chart and patient qualifies under Medicare guidelines for home O2.   9:25AM- Called and spoke with RT, Brenna, and let her know that patient qualifies to be setup. Was given patient's daughter, Yudelka, contact number as he lives with her.   9:29AM- Attempted call to Yudelka, was connected and asked to call back in an hour.   10:37AM- Attempted call to Yudelka with no answer.   10:41AM- Attempted call to Yudelka with no answer. LVM.   10:55 AM- Attempted call to Yudelka with no answer. LVM.  10:56AM- Attempted call to Isreal's room- No answer.   10:58AM- Called nurses station, pt's nurse Nicollete transferred me to patient's room and made sure that he answered the phone. Spoke to Isreal and offered choice, pt is okay with [facility] setting him up for home oxygen. We talked through the equipment options and patient prefers the POC option at this time. Informed him that we would deliver the POC to bedside and would be there within 2 hours.   11:40AM-  dispatched from [facility] office.

## 2021-06-25 NOTE — PROGRESS NOTES
Progress Notes by Silvia Mccray MD at 11/6/2017  8:30 AM     Author: Silvia Mccray MD Service: -- Author Type: Physician    Filed: 11/6/2017  9:08 AM Encounter Date: 11/6/2017 Status: Signed    : Silvia Mccray MD (Physician)           Click to link to [facility] HEART [facility] NOTE    Thank you, Dr. Shook, for asking us to see Isreal Lubin at the [facility].      Assessment/Recommendations   Assessment:    1.  Murmur: Loud murmur on exam concerning for possibly significant aortic insufficiency.  Will repeat complete echo for further evaluation.  2.  Hypertension: Mildly elevated today however given his recent fall with symptoms of orthostasis will not increase his antihypertensives at this time.  3.  Diastolic congestive heart failure: Very well controlled at this time.  I think it would be reasonable to decrease her Lasix dose to 20 a day and have him take the extra dose on an as-needed basis given the recent fall with postural dizziness.  Follow-up in 3 months.       History of Present Illness    Mr. Isreal Lubin is a 88 y.o. male with history of severe aortic stenosis status post TAVR on 4/12/17 with a Konstantin S3 23 bioprosthetic valve, diabetes mellitus, hypertension, diastolic congestive heart failure being seen today in follow-up.  Following his surgery he was admitted with acute on chronic diastolic heart failure.  He then had his Lasix dose increased as an outpatient in September to 20 mg twice a day from once a day.  He was then admitted with a fall couple weeks ago.  He stood up got dizzy and fell.  He cracked his ribs and suffered a pneumothorax.  He is here today accompanied by his daughter who lives with him.  He continues to have some dizziness with standing and continues to take Lasix 20 twice a day.  His weights have been stable and he has not noted any orthopnea, shortness of breath or increased edema.       Physical  Examination Review of Systems   Vitals:    11/06/17 0828   BP: 142/46   Pulse: 60   Resp: 16     Body mass index is 29.39 kg/(m^2).  Wt Readings from Last 3 Encounters:   11/06/17 199 lb (90.3 kg)   10/21/17 199 lb 9 oz (90.5 kg)   09/21/17 200 lb (90.7 kg)       General Appearance:   alert, no apparent distress   HEENT:  no scleral icterus; the mucous membranes are pink and moist                                  Neck: jugular venous pressure normal   Chest: the spine is straight and the chest is symmetric   Lungs:   respirations unlabored; the lungs are clear to auscultation   Cardiovascular:   regular rhythm with normal first and second heart sounds and II/VI blowing diastolic murmur; carotid pulses are intact and there are no carotid  bruits.   Abdomen:  no organomegaly, masses, bruits, or tenderness; bowel sounds are present   Extremities: trace edema   Skin: no xanthelasma    General: Weight Loss  Eyes: WNL  Ears/Nose/Throat: WNL  Lungs: Snoring  Heart: WNL  Stomach: WNL  Bladder: Frequent Urination at Night  Muscle/Joints: WNL  Skin: WNL  Nervous System: Falls, Loss of Balance  Mental Health: Confusion     Blood: WNL     Medical History  Surgical History Family History Social History   Past Medical History:   Diagnosis Date   ? Aortic stenosis    ? Arthritis    ? Chronic systolic congestive heart failure    ? Diabetes mellitus    ? Dyslipidemia    ? Glaucoma    ? Heart murmur    ? Hyperlipidemia     Created by Conversion    ? Hypertension     Past Surgical History:   Procedure Laterality Date   ? BACK SURGERY      Disc   ? CARDIAC CATHETERIZATION N/A 3/27/2017    Procedure: Coronary Angiogram;  Surgeon: Desmond Kaplan MD;  Location: [facility] Cath Lab;  Service:    ? ROTATOR CUFF REPAIR Left     Family History   Problem Relation Age of Onset   ? Diabetes Mother     Social History     Social History   ? Marital status: Single     Spouse name: N/A   ? Number of children: N/A   ? Years of education: N/A      Occupational History   ? Not on file.     Social History Main Topics   ? Smoking status: Former Smoker     Quit date: 9/29/1964   ? Smokeless tobacco: Not on file   ? Alcohol use 1.2 oz/week     2 Shots of liquor per week      Comment: daily   ? Drug use: No   ? Sexual activity: Not on file     Other Topics Concern   ? Not on file     Social History Narrative          Medications  Allergies   Current Outpatient Prescriptions   Medication Sig Dispense Refill   ? aspirin 81 MG EC tablet Take 81 mg by mouth daily.      ? camphor-methyl salicyl-menthol (SALONPAS) PtMd Apply 1 patch topically daily as needed. Remove patch from the skin after at most 8-hour application. Applied to left hand.     ? clopidogrel (PLAVIX) 75 mg tablet Take 1 tablet (75 mg total) by mouth daily. 60 tablet 0   ? cyanocobalamin (VITAMIN B-12) 1000 MCG tablet Take 1,000 mcg by mouth every evening.      ? dorzolamide-timolol (COSOPT) 2-0.5 % ophthalmic solution Administer 1 drop into the left eye 2 (two) times a day.     ? furosemide (LASIX) 20 MG tablet Take 1 tablet (20 mg total) by mouth daily. 60 tablet 0   ? glipiZIDE (GLUCOTROL) 10 MG tablet Take 10-20 mg by mouth see administration instructions. 20 mg (2 tablets) in the morning before breakfast and 10 mg (1 tablet) in the evening before supper.     ? insulin glargine (LANTUS) 100 unit/mL injection Inject 26 Units under the skin at bedtime.      ? lansoprazole (PREVACID) 15 MG capsule Take 15 mg by mouth daily.      ? latanoprost (XALATAN) 0.005 % ophthalmic solution Administer 1 drop into the left eye bedtime.     ? lisinopril (PRINIVIL,ZESTRIL) 40 MG tablet Take 1 tablet (40 mg total) by mouth daily.     ? melatonin 5 mg Tab tablet Take 5 mg by mouth at bedtime as needed.     ? metFORMIN (GLUCOPHAGE) 1000 MG tablet Take 1,000 mg by mouth 2 (two) times a day with meals.     ? metoprolol succinate (TOPROL-XL) 25 MG Take 1 tablet (25 mg total) by mouth daily.     ? oxyCODONE (ROXICODONE)  5 MG immediate release tablet Take 1 tablet (5 mg total) by mouth every 3 (three) hours as needed. 45 tablet 0   ? potassium chloride SA (K-DUR,KLOR-CON) 10 MEQ tablet Take 1 tablet (10 mEq total) by mouth daily. 30 tablet 0   ? simvastatin (ZOCOR) 20 MG tablet Take 20 mg by mouth bedtime.     ? tamsulosin (FLOMAX) 0.4 mg Cp24 Take 0.4 mg by mouth daily after supper.     ? acetaminophen (TYLENOL) 325 MG tablet Take 2 tablets (650 mg total) by mouth 3 (three) times a day.  0   ? melatonin 5 mg cap Take by mouth.       No current facility-administered medications for this visit.       No Known Allergies      Lab Results    Chemistry/lipid CBC Cardiac Enzymes/BNP/TSH/INR   Lab Results   Component Value Date    CHOL 157 10/19/2017    HDL 57 10/19/2017    LDLCALC 67 10/19/2017    TRIG 165 (H) 10/19/2017    CREATININE 0.94 10/21/2017    BUN 13 10/21/2017    K 3.6 10/21/2017     10/21/2017     10/21/2017    CO2 24 10/21/2017    Lab Results   Component Value Date    WBC 5.9 10/21/2017    HGB 12.0 (L) 10/21/2017    HCT 34.8 (L) 10/21/2017    MCV 90 10/21/2017     10/21/2017    Lab Results   Component Value Date    CKTOTAL 38 04/26/2017    TROPONINI 0.02 10/19/2017     (H) 09/05/2017    INR 1.12 (H) 10/19/2017

## 2021-06-26 NOTE — PROGRESS NOTES
Progress Notes by Chiquis Goldstein CNP at 6/21/2018  9:50 AM     Author: Chiquis Goldstein CNP Service: -- Author Type: Nurse Practitioner    Filed: 6/21/2018 11:06 AM Encounter Date: 6/21/2018 Status: Signed    : Chiquis Goldstein CNP (Nurse Practitioner)           Click to link to [facility] HEART CARE NOTE      Assessment/Recommendations   Assessment:    1.  Heart failure with preserved ejection fraction, NYHA class III: Decompensated.  He has gained weight since discharge approximately 7 pounds.  His lower extremity edema has also increased.  He also has dyspnea on exertion and fatigue.  He receives moms meals 7 times a week.  He watched the heart failure video today.    2.  Hypertension: Uncontrolled.  Blood pressure 158/44 with a recheck of 122/50.    Plan:  1.  Increase Lasix to 80 mg in the morning and 40 mg in the afternoon  2.  BMP and magnesium pending  3.  Ordered compression stockings for his lower extremity edema  4.  Continue daily weights and low-sodium diet    Isreal Lubin will follow up with Dr. Mccray in 6-8 weeks and in the heart failure clinic in 3-4 weeks.     History of Present Illness    Mr. Isreal Lubin is a 88 y.o. male seen at [facility] heart failure clinic today for continued follow-up.  His daughter Yudelka accompanies him today.  He follows up for heart failure with preserved ejection fraction.  He was recently in [facility] from May 27 June 1, 2018 for dyspnea which he had community-acquired pneumonia and bilateral pleural effusions.  His BNP on admission was 783.  He was given IV diuretics and lost about 6 pounds during hospitalization.  His most recent echocardiogram was done May 29, 2018 which showed an ejection fraction of 56% along with mild mitral regurgitation and moderate aortic regurgitation.  He has a past medical history significant for hypertension, coronary artery disease, hyperlipidemia, persistent atrial  fibrillation, diabetes, dementia, and CVA.  He had a TAVR April 12, 2017.    Today, he comes in with continued dyspnea on exertion, fatigue, lower extremity edema.  He has had some weight gain since discharge.  His daughter states he has also been wheezing.  He denies any orthopnea or PND.  He denies chest pain.  He denies lightheadedness, shortness of breath, orthopnea, PND, palpitations, chest pain and abdominal fullness/bloating.      He is monitoring home weights which have increased since discharge.  He states his weight is around 207 pounds.  This is approximately 7 pounds above discharge weight.  He is following a low sodium diet.      ECHO 5/29/2018:   Summary     Left ventricle ejection fraction is normal. The calculated left ventricular ejection fraction is 56% without wall motion abnormality..    Normal right ventricular size and systolic function.    Bioprosthetic aortic valve with elevated mean gradient. There are 2 jets of paravalvular insufficiency. Together the severity of aortic insufficiency moderate.    Mild mitral regurgitation    When compared to the previous study dated 11/13/2017, no significant change identified.          Physical Examination Review of Systems   Vitals:    06/21/18 1013   BP: 122/50   Pulse:    Resp:      Body mass index is 29.56 kg/(m^2).  Wt Readings from Last 3 Encounters:   06/21/18 206 lb (93.4 kg)   06/01/18 200 lb 3.2 oz (90.8 kg)   04/09/18 200 lb (90.7 kg)       General Appearance:     Alert, cooperative and in no acute distress.   ENT/Mouth: membranes moist, no oral lesions or bleeding gums.      EYES:  no scleral icterus, normal conjunctivae   Neck: no carotid bruits or thyromegaly   Chest/Lungs:   lungs are clear to auscultation, no rales or wheezing, respirations unlabored   Cardiovascular:    Irregularly irregular. Normal first and second heart sounds with grade 2/6 systolic murmurs, no rubs, or gallops; the carotid, radial and posterior tibial pulses are  intact, Jugular venous pressure normal, 1-2+ edema bilateral lower extremities    Abdomen:  Soft, nontender, nondistended, bowel sounds present   Extremities: no cyanosis or clubbing   Skin: warm, dry.    Neurologic: mood and affect are appropriate, alert and oriented x3      General: Weight Gain     Ears/Nose/Throat: Hearing Loss  Lungs: Cough, Shortness of Breath, Wheezing  Heart: Shortness of Breath with activity, Leg Swelling  Stomach: Diarrhea  Bladder: Frequent Urination at Night  Muscle/Joints: WNL  Skin: WNL  Nervous System: Loss of Balance  Mental Health: Confusion     Blood: WNL     Medical History  Surgical History Family History Social History   Past Medical History:   Diagnosis Date   ? Aortic stenosis     now S/P TAVR in April, 2017   ? Atrial fibrillation (H) 04/01/2018   ? 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 Dementia 2016   ? Diabetes mellitus (H)    ? Diastolic heart failure due to valvular disease (aortic stenosis)    ? Dyslipidemia, goal LDL below 70    ? Essential hypertension    ? Glaucoma    ? Nonocclusive coronary atherosclerosis of native coronary artery 03/27/2017    per invasive coronary angiogram   ? Type 2 diabetes mellitus with complication, unspecified long term insulin use status (H)     Created by Conversion     Past Surgical History:   Procedure Laterality Date   ? AORTIC VALVE REPLACEMENT  04/12/2017    TAVR   ? BACK SURGERY      Disc   ? CARDIAC CATHETERIZATION N/A 3/27/2017    Procedure: Coronary Angiogram;  Surgeon: Desmond Kaplan MD;  Location: [facility] Cath Lab;  Service:    ? CATARACT EXTRACTION, BILATERAL     ? ROTATOR CUFF REPAIR Left     Family History   Problem Relation Age of Onset   ? Diabetes Mother    ? Diabetes type II Daughter    ? Valvular heart disease Daughter      she will have heart surgery in Jan 2018 at [facility] but does not know which valve will be replaced   ? Stroke Sister 85   ? Brain cancer Sister 80   ? No Medical Problems Daughter    ?  Diabetes type II Son     Social History     Social History   ? Marital status:      Spouse name: N/A   ? Number of children: 3   ? Years of education: N/A     Occupational History   ?  Retired     Social History Main Topics   ? Smoking status: Former Smoker     Types: Cigarettes     Quit date: 1964   ? Smokeless tobacco: Never Used   ? Alcohol use 8.4 oz/week     14 Shots of liquor per week      Comment: daily use   ? Drug use: No   ? Sexual activity: Not Currently     Partners: Female     Other Topics Concern   ? Not on file     Social History Narrative    He lives with his daughter, Yudelka; his bedroom is upstairs. His wife  when she was 62.        He was in the [employer] and served during the [employer].          Medications  Allergies   Current Outpatient Prescriptions   Medication Sig Dispense Refill   ? amLODIPine (NORVASC) 10 MG tablet Take 1 tablet (10 mg total) by mouth at bedtime. 90 tablet 3   ? aspirin 325 MG EC tablet Take 1 tablet (325 mg total) by mouth daily.  0   ? camphor-methyl salicyl-menthol (SALONPAS) PtMd Apply 1 patch topically daily as needed. Remove patch from the skin after at most 8-hour application. Applied to left hand.     ? cyanocobalamin (VITAMIN B-12) 1000 MCG tablet Take 1,000 mcg by mouth every evening.      ? dorzolamide-timolol (COSOPT) 22.3-6.8 mg/mL ophthalmic solution Administer 1 drop into the left eye 2 (two) times a day.     ? furosemide (LASIX) 40 MG tablet Take 80 mg in the morning and 40 mg in the afternoon 90 tablet 11   ? glipiZIDE (GLUCOTROL) 10 MG tablet Take 20 mg by mouth daily with breakfast. 20 mg (2 tablets) in the morning before breakfast and 10 mg (1 tablet) in the evening before supper.     ? glipiZIDE (GLUCOTROL) 10 MG tablet Take 10 mg by mouth daily with supper. 20 mg (2 tablets) in the morning before breakfast and 10 mg (1 tablet) in the evening before supper.     ? insulin glargine (LANTUS) 100 unit/mL injection Inject 20 Units under the  skin at bedtime.      ? lansoprazole (PREVACID) 15 MG capsule Take 15 mg by mouth daily.      ? latanoprost (XALATAN) 0.005 % ophthalmic solution Administer 1 drop into the left eye bedtime.     ? lisinopril (PRINIVIL,ZESTRIL) 40 MG tablet Take 40 mg by mouth daily.     ? melatonin 5 mg Tab tablet Take 5 mg by mouth at bedtime as needed.     ? metFORMIN (GLUCOPHAGE) 1000 MG tablet Take 1,000 mg by mouth 2 (two) times a day with meals.     ? metoprolol succinate (TOPROL-XL) 25 MG Take 0.5 tablets (12.5 mg total) by mouth daily. 45 tablet 3   ? simvastatin (ZOCOR) 20 MG tablet Take 20 mg by mouth bedtime.     ? tamsulosin (FLOMAX) 0.4 mg Cp24 Take 0.4 mg by mouth daily after supper.       No current facility-administered medications for this visit.       No Known Allergies      Lab Results    Chemistry CBC BNP   Lab Results   Component Value Date    CREATININE 1.04 06/01/2018    BUN 17 06/01/2018     06/01/2018    K 3.6 06/01/2018     06/01/2018    CO2 22 06/01/2018     Creatinine (mg/dL)   Date Value   06/01/2018 1.04   05/31/2018 1.26   05/30/2018 1.14   05/29/2018 0.99    Lab Results   Component Value Date    WBC 6.1 05/31/2018    HGB 11.4 (L) 05/31/2018    HCT 34.6 (L) 05/31/2018    MCV 91 05/31/2018     05/31/2018    Lab Results   Component Value Date     (H) 05/27/2018     BNP (pg/mL)   Date Value   05/27/2018 783 (H)   09/05/2017 345 (H)   04/14/2017 548 (H)          25 minutes were spent with the patient with greater than 50% spent on education and counseling.      Chiquis Goldstein, [facility]   Heart Failure Clinic

## 2021-06-26 NOTE — PROGRESS NOTES
Progress Notes by Silvia Mccray MD at 8/29/2018 10:10 AM     Author: Silvia Mccray MD Service: -- Author Type: Physician    Filed: 8/29/2018  1:26 PM Encounter Date: 8/29/2018 Status: Signed    : Silvia Mccray MD (Physician)           Click to link to [facility] HEART CARE NOTE    Thank you, Dr. Shook, for asking us to see Isreal Lubin at the [facility].      Assessment/Recommendations   Assessment:    This is an 89-year-old man with history of atrial fibrillation, CVA/dementia, aortic stenosis status post T AVR in April 2017, class III diastolic congestive heart failure who I am seeing today in follow-up.  He has been placed on hospice since I last saw him.  He is here accompanied by his daughter as he had already been scheduled for an appointment.  He does have problems with worsening lower extremity edema and I recommended that he take an extra dose of Lasix on an as-needed basis in addition to his regular dose of 80 mg twice a day.  For the next 3 days he may take the extra dose.  As he is currently on hospice he does not need to schedule a follow-up at this time.       History of Present Illness    Mr. Isreal Lubin is a 89 y.o. male with history of persistent atrial fibrillation, CVA, dementia, aortic stenosis status post AVR on 4/12/17,  NYHA class III diastolic congestive heart failure who I am seeing today in follow-up.  He has had multiple admissions for decompensated congestive heart failure.  On his last appointment in CHF clinic he expressed that he was very frustrated and was tired of the hospital admissions.  He decided to go on hospice since then and has been on hospice now for 2 months.  He is here today accompanied by his daughter.  He has noted problems with worsening edema and is currently on antibiotics for cellulitis.  He has chronic dyspnea.  No chest pain.         Physical Examination Review of Systems    Vitals:    08/29/18 1003   BP: 144/68   Pulse: 78     Body mass index is 29.98 kg/(m^2).  Wt Readings from Last 3 Encounters:   08/29/18 203 lb (92.1 kg)   07/12/18 210 lb (95.3 kg)   07/07/18 203 lb (92.1 kg)       General Appearance:   alert, no apparent distress   HEENT:  no scleral icterus; the mucous membranes are pink and moist                                  Neck: jugular venous pressure elevated   Chest: the spine is straight and the chest is symmetric   Lungs:   respirations unlabored; the lungs are clear to auscultation   Cardiovascular:   regular rhythm with normal first and second heart sounds and no murmurs or gallops   Abdomen:  no organomegaly, masses, bruits, or tenderness; bowel sounds are present   Extremities: +++ edema   Skin: no xanthelasma    General: WNL  Eyes: WNL  Ears/Nose/Throat: Hearing Loss  Lungs: Cough, Shortness of Breath, Snoring, Wheezing  Heart: WNL  Stomach: WNL  Bladder: Frequent Urination at Night  Muscle/Joints: WNL  Skin: WNL  Nervous System: Falls, Daytime Sleepiness, Loss of Balance  Mental Health: Confusion     Blood: WNL     Medical History  Surgical History Family History Social History   Past Medical History:   Diagnosis Date   ? CAP (community acquired pneumonia) 5/27/2018   ? 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 Dementia 2016   ? Diabetes mellitus (H)    ? Diastolic heart failure due to valvular disease (aortic stenosis)    ? DNAR (do not attempt resuscitation)    ? Dyslipidemia, goal LDL below 70    ? Essential hypertension    ? Glaucoma    ? History of aortic stenosis     now S/P TAVR in April, 2017   ? Nonocclusive coronary atherosclerosis of native coronary artery 03/27/2017    per invasive coronary angiogram   ? Permanent atrial fibrillation (H) 7/4/2018   ? Type 2 diabetes mellitus with complication, unspecified long term insulin use status (H)     Past Surgical History:   Procedure Laterality Date   ? AORTIC VALVE REPLACEMENT  04/12/2017    TAVR   ?  BACK SURGERY      Disc   ? CARDIAC CATHETERIZATION N/A 3/27/2017    Procedure: Coronary Angiogram;  Surgeon: Desmond Kaplan MD;  Location: [facility] Cath Lab;  Service:    ? CATARACT EXTRACTION, BILATERAL     ? ROTATOR CUFF REPAIR Left     Family History   Problem Relation Age of Onset   ? Diabetes Mother    ? Diabetes type II Daughter    ? Valvular heart disease Daughter      she will have heart surgery in 2018 at [place] but does not know which valve will be replaced   ? Stroke Sister 85   ? Brain cancer Sister 80   ? No Medical Problems Daughter    ? Diabetes type II Son     Social History     Social History   ? Marital status:      Spouse name: N/A   ? Number of children: 3   ? Years of education: N/A     Occupational History   ?  Retired     Social History Main Topics   ? Smoking status: Former Smoker     Types: Cigarettes     Quit date: 1964   ? Smokeless tobacco: Never Used   ? Alcohol use 8.4 oz/week     14 Shots of liquor per week      Comment: daily use   ? Drug use: No   ? Sexual activity: Not Currently     Partners: Female     Other Topics Concern   ? Not on file     Social History Narrative    He lives with his daughter, Yudelka; his bedroom is upstairs. His wife  when she was 62.        He was in the [employer] and served during the [place] [employer].          Medications  Allergies   Current Outpatient Prescriptions   Medication Sig Dispense Refill   ? aspirin 325 MG EC tablet Take 1 tablet (325 mg total) by mouth daily.  0   ? camphor-methyl salicyl-menthol (SALONPAS) PtMd Apply 1 patch topically daily as needed. Remove patch from the skin after at most 8-hour application. Applied to left hand.     ? cyanocobalamin (VITAMIN B-12) 1000 MCG tablet Take 1,000 mcg by mouth every evening.      ? dorzolamide-timolol (COSOPT) 22.3-6.8 mg/mL ophthalmic solution Administer 1 drop into the left eye 2 (two) times a day.     ? furosemide (LASIX) 40 MG tablet Take 1 tablet (40 mg total) by mouth 2  (two) times a day at 9am and 6pm. 60 tablet 3   ? glipiZIDE (GLUCOTROL) 5 MG tablet Take 1 tablet (5 mg total) by mouth 2 (two) times a day before meals. 60 tablet 0   ? insulin glargine (LANTUS) 100 unit/mL injection Inject 20 Units under the skin at bedtime.      ? lansoprazole (PREVACID) 15 MG capsule Take 15 mg by mouth daily.      ? latanoprost (XALATAN) 0.005 % ophthalmic solution Administer 1 drop into the left eye bedtime.     ? lisinopril (PRINIVIL,ZESTRIL) 40 MG tablet Take 40 mg by mouth daily.     ? magnesium chloride (SLOW-MAG) 64 mg TbEC delayed-release tablet Take 1 tablet (64 mg total) by mouth 2 (two) times a day.  0   ? melatonin 5 mg Tab tablet Take 5 mg by mouth at bedtime as needed.     ? metFORMIN (GLUCOPHAGE) 1000 MG tablet Take 1,000 mg by mouth 2 (two) times a day with meals.     ? simvastatin (ZOCOR) 20 MG tablet Take 20 mg by mouth bedtime.     ? tamsulosin (FLOMAX) 0.4 mg Cp24 Take 0.4 mg by mouth daily after supper.     ? amLODIPine (NORVASC) 10 MG tablet Take 1 tablet (10 mg total) by mouth at bedtime. 90 tablet 3   ? metoprolol succinate (TOPROL-XL) 25 MG Take 0.5 tablets (12.5 mg total) by mouth daily. 45 tablet 3     No current facility-administered medications for this visit.       No Known Allergies      Lab Results    Chemistry/lipid CBC Cardiac Enzymes/BNP/TSH/INR   Lab Results   Component Value Date    CHOL 150 04/05/2018    HDL 40 04/05/2018    LDLCALC 87 04/05/2018    TRIG 113 04/05/2018    CREATININE 1.39 (H) 07/12/2018    BUN 44 (H) 07/12/2018    K 4.0 07/12/2018     07/12/2018     (H) 07/12/2018    CO2 21 (L) 07/12/2018    Lab Results   Component Value Date    WBC 6.8 07/05/2018    HGB 10.0 (L) 07/05/2018    HCT 30.3 (L) 07/05/2018    MCV 88 07/05/2018     07/05/2018    Lab Results   Component Value Date    CKTOTAL 38 04/26/2017    TROPONINI 0.03 07/04/2018     (H) 07/07/2018    TSH 1.55 05/30/2018    INR 1.20 (H) 07/03/2018

## 2021-06-26 NOTE — PROGRESS NOTES
Progress Notes by Silvia Mccray MD at 4/9/2018  1:30 PM     Author: Silvia Mccray MD Service: -- Author Type: Physician    Filed: 4/9/2018  3:27 PM Encounter Date: 4/9/2018 Status: Signed    : Silvia Mccray MD (Physician)           Click to link to [facility] HEART CARE NOTE    Thank you, Dr. Shook, for asking us to see Isreal Lubin at the [facility].      Assessment/Recommendations   Assessment:    1.  Recurrent presyncope/syncope: Recommend referral to EP for consideration of ILR.  Suspect this may be orthostasis however he does have evidence of conduction disease on his EKG.  In the meantime recommend compression stockings, standing up slowly and using his walker.  2.  Hypertension: Poorly controlled today however given his history of falls will hold off on adjusting his antihypertensives today.  3.  Aortic stenosis: Severe status post TAVR normal functioning on recent TAMIR.  Recommend prophylaxis prior to dental procedures.  Follow-up in a few months.       History of Present Illness    Mr. Isreal Lubin is a 88 y.o. male with history of severe aortic stenosis status post TAVR on 4/12/17 with a Konstantin S3 23 bioprosthetic valve, diabetes mellitus, hypertension, diastolic congestive heart failure being seen today in follow-up.    He had another fall prior to me seeing him.  He reports that he was standing up and doing laundry when he felt lightheaded and fell.  He suffered severe contusion over his right shoulder.  Workup in the ED was unremarkable.  First EKG shows what appears to be atrial fibrillation with a controlled rate and second EKG shows sinus bradycardia with first-degree AV block.  He underwent it patient activated monitor which did not show any significant bradycardic events and recommended considering ILR.  He had one previous significant fall in November.  He is very unsteady and uses a walker.  It seems that the  lightheadedness is positional in nature.  He is trying to get up more slowly.  No complaints of chest pain.  He has chronic dyspnea with exertion specifically going up stairs.  This has remained unchanged.    11/30/2017 TAMIR  1. Normal left ventricular size and systolic performance with a visually estimated ejection fraction of 65-70%.   2. There is a bioprosthetic aortic valve (documented 23 mm NJ 3 TFX).  The valve appears well seated & positioned.     The mean gradient across the aortic valve is mildly increased at 24 mmHg.  The leaflets appear to have very good excursion and the slightly higher mean gradient likely reflects the mildly increased ejection fraction.      There are 2 separate mild  jets of paravalvular aortic insufficiency.  Together, the degree of aortic insufficiency appears mild-moderate (see attached image clip).  3. Normal right ventricular size and systolic performance.   4. There is mild left atrial enlargement.   5. No intracardiac mass or thrombus is detected.  6. Echo contrast examination was performed using agitated NS as contrast agent. The right heart opacity was adequate and the left heart was adequately visualized. There was no evidence of right to left shunt during spontaneous respiration or following release of Valsalva.     Formerly [facility], [facility]     MULTI-DAY PATIENT ACTIVATED MONITOR (KIMBERLY) REPORT     Results:    Indication for study: Syncope and collapse    The monitor was worn: December 22, 2017 through January 20, 2018.  Compliance with the monitor was poor with only 6% of the recording interval having interpretable data.    The baseline rhythm transmission demonstrated mild sinus bradycardia with heart rates in the 90s to low 100s.  TN interval, QRS duration, and QT intervals appear to be normal.  There were rare isolated PACs..    The patient had no manually activated rhythm recordings.      The patient had only 2 auto triggered recordings.  Symptoms were not reported.  The  "patient's rhythm demonstrated mild sinus tachycardia or sinus rhythm with heart rates in the 70s.  There was no ectopy.  There was no evidence of sustained atrial or ventricular tachyarrhythmia.  There was no profound bradycardia or pauses.     Impression:    Minimal data for interpretation however the the data that is available appears to indicate normal sinus rhythm.    Given the minimal amount of data available sensitivity and specificity for this recording is poor.    If the patient's indication for study \"syncope and collapse\" are worrisome for cardiac etiology then consider implantable loop recorder for this patient.    No sustained atrial or ventricular tachyarrhythmias demonstrated during the minimal data available for review.    No profound bradycardia or pauses again during the minimal data available for review.       Physical Examination Review of Systems   Vitals:    04/09/18 1336   BP: 164/72   Pulse: 88   Resp: 20     Body mass index is 28.7 kg/(m^2).  Wt Readings from Last 3 Encounters:   04/09/18 200 lb (90.7 kg)   04/01/18 200 lb (90.7 kg)   12/21/17 200 lb 3.2 oz (90.8 kg)       General Appearance:   alert, no apparent distress   HEENT:  no scleral icterus; the mucous membranes are pink and moist                                  Neck: jugular venous pressure normal   Chest: the spine is straight and the chest is symmetric   Lungs:   respirations unlabored; the lungs are clear to auscultation   Cardiovascular:   regular rhythm with normal first and second heart sounds and colic murmur   Abdomen:  no organomegaly, masses, bruits, or tenderness; bowel sounds are present   Extremities: no edema   Skin: no xanthelasma    General: WNL  Eyes: WNL  Ears/Nose/Throat: WNL  Lungs: WNL  Heart: WNL  Stomach: WNL  Bladder: WNL  Muscle/Joints: WNL  Skin: WNL  Nervous System: WNL  Mental Health: WNL     Blood: WNL     Medical History  Surgical History Family History Social History   Past Medical History: "   Diagnosis Date   ? Aortic stenosis     now S/P TAVR in    ? 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 Dementia    ? Diabetes mellitus    ? Diastolic heart failure due to valvular disease (aortic stenosis)    ? Dyslipidemia, goal LDL below 70    ? Essential hypertension    ? Glaucoma    ? Nonocclusive coronary atherosclerosis of native coronary artery 2017    per invasive coronary angiogram   ? Pneumonia of right upper lobe due to infectious organism     Past Surgical History:   Procedure Laterality Date   ? AORTIC VALVE REPLACEMENT  2017    TAVR   ? BACK SURGERY      Disc   ? CARDIAC CATHETERIZATION N/A 3/27/2017    Procedure: Coronary Angiogram;  Surgeon: Desmond Kaplan MD;  Location: [facility] Cath Lab;  Service:    ? CATARACT EXTRACTION, BILATERAL     ? ROTATOR CUFF REPAIR Left     Family History   Problem Relation Age of Onset   ? Diabetes Mother    ? Diabetes type II Daughter    ? Valvular heart disease Daughter      she will have heart surgery in 2018 at [place] but does not know which valve will be replaced   ? Stroke Sister 85   ? Brain cancer Sister 80   ? No Medical Problems Daughter    ? Diabetes type II Son     Social History     Social History   ? Marital status:      Spouse name: N/A   ? Number of children: 3   ? Years of education: N/A     Occupational History   ?  Retired     Social History Main Topics   ? Smoking status: Former Smoker     Types: Cigarettes     Quit date: 1964   ? Smokeless tobacco: Never Used   ? Alcohol use 8.4 oz/week     14 Shots of liquor per week      Comment: daily use   ? Drug use: No   ? Sexual activity: Not Currently     Partners: Female     Other Topics Concern   ? Not on file     Social History Narrative    He lives with his daughter, Yudelka; his bedroom is upstairs. His wife  when she was 62.          Medications  Allergies   Current Outpatient Prescriptions   Medication Sig Dispense Refill   ? aspirin 81 MG EC tablet  Take 81 mg by mouth daily.      ? camphor-methyl salicyl-menthol (SALONPAS) PtMd Apply 1 patch topically daily as needed. Remove patch from the skin after at most 8-hour application. Applied to left hand.     ? clopidogrel (PLAVIX) 75 mg tablet Take 1 tablet (75 mg total) by mouth daily. 60 tablet 0   ? cyanocobalamin (VITAMIN B-12) 1000 MCG tablet Take 1,000 mcg by mouth every evening.      ? dorzolamide-timolol (COSOPT) 2-0.5 % ophthalmic solution Administer 1 drop into the left eye 2 (two) times a day.     ? glipiZIDE (GLUCOTROL) 10 MG tablet Take 20 mg by mouth daily with breakfast. 20 mg (2 tablets) in the morning before breakfast and 10 mg (1 tablet) in the evening before supper.     ? glipiZIDE (GLUCOTROL) 10 MG tablet Take 10 mg by mouth daily with supper. 20 mg (2 tablets) in the morning before breakfast and 10 mg (1 tablet) in the evening before supper.     ? insulin glargine (LANTUS) 100 unit/mL injection Inject 18 Units under the skin at bedtime.      ? lansoprazole (PREVACID) 15 MG capsule Take 15 mg by mouth daily.      ? latanoprost (XALATAN) 0.005 % ophthalmic solution Administer 1 drop into the left eye bedtime.     ? lisinopril (PRINIVIL,ZESTRIL) 40 MG tablet Take 40 mg by mouth daily.     ? melatonin 5 mg Tab tablet Take 5 mg by mouth at bedtime as needed.     ? metFORMIN (GLUCOPHAGE) 1000 MG tablet Take 1,000 mg by mouth 2 (two) times a day with meals.     ? metoprolol succinate (TOPROL-XL) 25 MG Take 50 mg by mouth daily.      ? oxyCODONE (ROXICODONE) 5 MG immediate release tablet Take 1 tablet (5 mg total) by mouth every 3 (three) hours as needed. 45 tablet 0   ? simvastatin (ZOCOR) 20 MG tablet Take 20 mg by mouth bedtime.       No current facility-administered medications for this visit.       No Known Allergies      Lab Results    Chemistry/lipid CBC Cardiac Enzymes/BNP/TSH/INR   Lab Results   Component Value Date    CHOL 150 04/05/2018    HDL 40 04/05/2018    LDLCALC 87 04/05/2018    TRIG  113 04/05/2018    CREATININE 0.93 04/05/2018    BUN 12 04/05/2018    K 3.7 04/05/2018     04/05/2018     04/05/2018    CO2 23 04/05/2018    Lab Results   Component Value Date    WBC 7.8 04/05/2018    HGB 13.6 (L) 04/05/2018    HCT 41.0 04/05/2018    MCV 93 04/05/2018     04/05/2018    Lab Results   Component Value Date    CKTOTAL 38 04/26/2017    TROPONINI 0.22 04/01/2018     (H) 09/05/2017    TSH 3.06 04/05/2018    INR 1.12 (H) 10/19/2017

## 2021-06-26 NOTE — PROGRESS NOTES
Progress Notes by Chiquis Goldstein CNP at 7/12/2018  7:50 AM     Author: Chiquis Goldstein CNP Service: -- Author Type: Nurse Practitioner    Filed: 7/12/2018  8:34 AM Encounter Date: 7/12/2018 Status: Signed    : Chiquis Goldstein CNP (Nurse Practitioner)           Click to link to [facility] HEART CARE NOTE      Assessment/Recommendations   Assessment:    1.  Heart failure with preserved ejection fraction, NYHA class III: Decompensated.  He has had a 7 pound weight gain in the last 5 days.  He states his dyspnea on exertion and fatigue have worsened.  He states today he is fed up with everything he has been going through.  He is wanting to stay at home and not go to the hospital for treatment.  We had a long discussion about palliative/hospice care.  He is meeting with his primary doctor this morning and highly recommended he and his daughter talk to Dr. Shook regarding this.  They were both in agreement that this is what they want at this time.    2.  Persistent atrial fibrillation: Rate controlled.  He continues metoprolol for rate control.  He is not on anticoagulation due to dementia and falls.  He is on aspirin 325 mg daily.    3.  Hypertension: Controlled.  Blood pressure 132/56    Plan:  1.  Continue current medications  2.  BMP and magnesium pending  3.  Continue low-sodium diet and daily weights  4.  Discuss hospice care with Dr. Shook this morning at follow-up.  5.  Discussed possibly starting metolazone if he decides not to start hospice.    Isreal Lubin will follow up with Dr. Mccray August 29 in the heart failure clinic in 2-3 weeks.     History of Present Illness    Mr. Isreal Lubin is a 88 y.o. male seen at [facility] heart failure clinic today for continued follow-up.  His daughter accompanies him today.  He follows up for heart failure with preserved ejection fraction.  His most recent echocardiogram was done May 29, 2018 which  "showed ejection fraction of 56% along with mild mitral regurgitation and moderate aortic regurgitation.  He was recently hospitalized at [facility] from July 3 - July 7, 2018 due to increased shortness of breath and lower extremity edema and weight gain.  His BNP on admission was 277.  He was diuresed with IV Lasix.  His Lasix was increased at discharge to 80 mg twice a day.  His discharge weight was 203 pounds.  This is his third hospitalization since December.  He has a past medical history significant for hypertension, coronary artery disease, hyperlipidemia, persistent atrial fibrillation, diabetes, dementia, and CVA. He had TAVR on April 12, 2017.    Today, he comes in with worsening dyspnea on exertion and fatigue.  He denies orthopnea or PND.  He denies abdominal bloating.  He states today \"he is fed up with all of this.\" He has also stated to his daughter he wants to stay home and not go to the hospital anymore for treatment. He denies lightheadedness, shortness of breath, orthopnea, PND, palpitations, chest pain and abdominal fullness/bloating.      He is monitoring home weights which have been increasing since discharge.  His discharge weight was 203 pounds.  Today he weighed 210 pounds.  He is following a low sodium diet.      ECHO 5/29/2018:   Summary     Left ventricle ejection fraction is normal. The calculated left ventricular ejection fraction is 56% without wall motion abnormality..    Normal right ventricular size and systolic function.    Bioprosthetic aortic valve with elevated mean gradient. There are 2 jets of paravalvular insufficiency. Together the severity of aortic insufficiency moderate.    Mild mitral regurgitation    When compared to the previous study dated 11/13/2017, no significant change identified.              Physical Examination Review of Systems   Vitals:    07/12/18 0755   BP: 132/56   Pulse: 60   Resp: 20     Body mass index is 31.01 kg/(m^2).  Wt Readings from Last 3 " Encounters:   07/12/18 210 lb (95.3 kg)   07/07/18 203 lb (92.1 kg)   06/21/18 206 lb (93.4 kg)       General Appearance:     Alert, cooperative and in no acute distress.   ENT/Mouth: membranes moist, no oral lesions or bleeding gums.      EYES:  no scleral icterus, normal conjunctivae   Neck: no carotid bruits or thyromegaly   Chest/Lungs:   lungs are clear to auscultation, no rales or wheezing, respirations unlabored   Cardiovascular:    Irregularly irregular. Normal first and second heart sounds with grade 2/6 systolic murmur, no rubs, or gallops; the carotid, radial and posterior tibial pulses are intact, Jugular venous pressure normal, 1-2+ edema bilateral lower extremities    Abdomen:  Soft, nontender, nondistended, bowel sounds present   Extremities: no cyanosis or clubbing   Skin: warm, dry.    Neurologic: mood and affect are appropriate, alert and oriented x3      General: Weight Gain  Eyes: WNL  Ears/Nose/Throat: Hearing Loss  Lungs: Cough, Shortness of Breath, Wheezing  Heart: Shortness of Breath with activity, Leg Swelling  Stomach: Diarrhea  Bladder: WNL  Muscle/Joints: Joint Pain  Skin: WNL  Nervous System: WNL  Mental Health: WNL     Blood: WNL     Medical History  Surgical History Family History Social History   Past Medical History:   Diagnosis Date   ? CAP (community acquired pneumonia) 5/27/2018   ? 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 Dementia 2016   ? Diabetes mellitus (H)    ? Diastolic heart failure due to valvular disease (aortic stenosis)    ? DNAR (do not attempt resuscitation)    ? Dyslipidemia, goal LDL below 70    ? Essential hypertension    ? Glaucoma    ? History of aortic stenosis     now S/P TAVR in April, 2017   ? Nonocclusive coronary atherosclerosis of native coronary artery 03/27/2017    per invasive coronary angiogram   ? Permanent atrial fibrillation (H) 7/4/2018   ? Type 2 diabetes mellitus with complication, unspecified long term insulin use status (H)     Past  Surgical History:   Procedure Laterality Date   ? AORTIC VALVE REPLACEMENT  2017    TAVR   ? BACK SURGERY      Disc   ? CARDIAC CATHETERIZATION N/A 3/27/2017    Procedure: Coronary Angiogram;  Surgeon: Desmond Kaplan MD;  Location: [facility] Cath Lab;  Service:    ? CATARACT EXTRACTION, BILATERAL     ? ROTATOR CUFF REPAIR Left     Family History   Problem Relation Age of Onset   ? Diabetes Mother    ? Diabetes type II Daughter    ? Valvular heart disease Daughter      she will have heart surgery in 2018 at [place] but does not know which valve will be replaced   ? Stroke Sister 85   ? Brain cancer Sister 80   ? No Medical Problems Daughter    ? Diabetes type II Son     Social History     Social History   ? Marital status:      Spouse name: N/A   ? Number of children: 3   ? Years of education: N/A     Occupational History   ?  Retired     Social History Main Topics   ? Smoking status: Former Smoker     Types: Cigarettes     Quit date: 1964   ? Smokeless tobacco: Never Used   ? Alcohol use 8.4 oz/week     14 Shots of liquor per week      Comment: daily use   ? Drug use: No   ? Sexual activity: Not Currently     Partners: Female     Other Topics Concern   ? Not on file     Social History Narrative    He lives with his daughter, Yudelka; his bedroom is upstairs. His wife  when she was 62.        He was in the [employer] and served during the [employer].          Medications  Allergies   Current Outpatient Prescriptions   Medication Sig Dispense Refill   ? amLODIPine (NORVASC) 10 MG tablet Take 1 tablet (10 mg total) by mouth at bedtime. 90 tablet 3   ? aspirin 325 MG EC tablet Take 1 tablet (325 mg total) by mouth daily.  0   ? camphor-methyl salicyl-menthol (SALONPAS) PtMd Apply 1 patch topically daily as needed. Remove patch from the skin after at most 8-hour application. Applied to left hand.     ? cyanocobalamin (VITAMIN B-12) 1000 MCG tablet Take 1,000 mcg by mouth every evening.      ?  dorzolamide-timolol (COSOPT) 22.3-6.8 mg/mL ophthalmic solution Administer 1 drop into the left eye 2 (two) times a day.     ? furosemide (LASIX) 40 MG tablet Take 2 tablets (80 mg total) by mouth 2 (two) times a day at 9am and 6pm. 120 tablet 0   ? glipiZIDE (GLUCOTROL) 5 MG tablet Take 1 tablet (5 mg total) by mouth 2 (two) times a day before meals. 60 tablet 0   ? insulin glargine (LANTUS) 100 unit/mL injection Inject 20 Units under the skin at bedtime.      ? lansoprazole (PREVACID) 15 MG capsule Take 15 mg by mouth daily.      ? latanoprost (XALATAN) 0.005 % ophthalmic solution Administer 1 drop into the left eye bedtime.     ? lisinopril (PRINIVIL,ZESTRIL) 40 MG tablet Take 40 mg by mouth daily.     ? magnesium chloride (SLOW-MAG) 64 mg TbEC delayed-release tablet Take 1 tablet (64 mg total) by mouth 2 (two) times a day.  0   ? melatonin 5 mg Tab tablet Take 5 mg by mouth at bedtime as needed.     ? metFORMIN (GLUCOPHAGE) 1000 MG tablet Take 1,000 mg by mouth 2 (two) times a day with meals.     ? metoprolol succinate (TOPROL-XL) 25 MG Take 0.5 tablets (12.5 mg total) by mouth daily. 45 tablet 3   ? simvastatin (ZOCOR) 20 MG tablet Take 20 mg by mouth bedtime.     ? tamsulosin (FLOMAX) 0.4 mg Cp24 Take 0.4 mg by mouth daily after supper.       No current facility-administered medications for this visit.       No Known Allergies      Lab Results    Chemistry CBC BNP   Lab Results   Component Value Date    CREATININE 1.21 07/07/2018    BUN 22 07/07/2018     07/07/2018    K 3.6 07/07/2018     07/07/2018    CO2 24 07/07/2018     Creatinine (mg/dL)   Date Value   07/07/2018 1.21   07/06/2018 1.21   07/05/2018 1.27   07/04/2018 1.32 (H)    Lab Results   Component Value Date    WBC 6.8 07/05/2018    HGB 10.0 (L) 07/05/2018    HCT 30.3 (L) 07/05/2018    MCV 88 07/05/2018     07/05/2018    Lab Results   Component Value Date     (H) 07/07/2018     BNP (pg/mL)   Date Value   07/07/2018 333 (H)    07/06/2018 288 (H)   07/03/2018 277 (H)          25 minutes were spent with the patient with greater than 50% spent on education and counseling.      Chiquis Goldstein, [facility]   Heart Failure Clinic

## 2021-07-14 PROBLEM — R00.1 SINUS BRADYCARDIA: Status: RESOLVED | Noted: 2017-04-26 | Resolved: 2017-12-20

## 2021-07-14 PROBLEM — I50.33 ACUTE ON CHRONIC DIASTOLIC HEART FAILURE (H): Status: RESOLVED | Noted: 2018-07-03 | Resolved: 2018-07-12

## 2021-07-14 PROBLEM — Z86.73 HISTORY OF TIA (TRANSIENT ISCHEMIC ATTACK): Status: RESOLVED | Noted: 2017-10-19 | Resolved: 2017-12-20
